# Patient Record
Sex: MALE
[De-identification: names, ages, dates, MRNs, and addresses within clinical notes are randomized per-mention and may not be internally consistent; named-entity substitution may affect disease eponyms.]

---

## 2023-03-28 ENCOUNTER — NURSE TRIAGE (OUTPATIENT)
Dept: OTHER | Facility: CLINIC | Age: 53
End: 2023-03-28

## 2023-03-28 NOTE — TELEPHONE ENCOUNTER
Location of patient: [place]    Subjective: Caller states \"pink eye\"     Current Symptoms: Both eyes are watering, cratchy, and red started last night, woke up this morning with eyelashes glued together. Onset: 1 day ago; gradual    Associated Symptoms: NA    Pain Severity: 1/10; scratchy; intermittent    Temperature: Denies     What has been tried: NA    LMP: NA Pregnant: NA    Recommended disposition: Home Care    Care advice provided, patient verbalizes understanding; denies any other questions or concerns; instructed to call back for any new or worsening symptoms. Patient agrees with home care, will follow up if neccessary    This triage is a result of a call to [address] [place] [address]. Please do not respond to the triage nurse through this encounter. Any subsequent communication should be directly with the patient.     Reason for Disposition   Red eye is part of a cold, and no eye pain or blurred vision    Protocols used: Eye - Red Without Pus-ADULT-[place]

## 2023-04-27 LAB
ALBUMIN (G/DL) IN SER/PLAS: 4.4 G/DL (ref 3.4–5)
ALBUMIN (MG/L) IN URINE: 609.1 MG/L
ALBUMIN/CREATININE (UG/MG) IN URINE: 709.9 UG/MG CRT (ref 0–30)
ANION GAP IN SER/PLAS: 15 MMOL/L (ref 10–20)
CALCIUM (MG/DL) IN SER/PLAS: 10.1 MG/DL (ref 8.6–10.6)
CARBON DIOXIDE, TOTAL (MMOL/L) IN SER/PLAS: 28 MMOL/L (ref 21–32)
CHLORIDE (MMOL/L) IN SER/PLAS: 99 MMOL/L (ref 98–107)
CREATININE (MG/DL) IN SER/PLAS: 0.84 MG/DL (ref 0.5–1.3)
CREATININE (MG/DL) IN URINE: 85.8 MG/DL (ref 20–370)
GFR MALE: >90 ML/MIN/1.73M2
GLUCOSE (MG/DL) IN SER/PLAS: 130 MG/DL (ref 74–99)
PHOSPHATE (MG/DL) IN SER/PLAS: 3.1 MG/DL (ref 2.5–4.9)
POTASSIUM (MMOL/L) IN SER/PLAS: 4.1 MMOL/L (ref 3.5–5.3)
SODIUM (MMOL/L) IN SER/PLAS: 138 MMOL/L (ref 136–145)
THYROTROPIN (MIU/L) IN SER/PLAS BY DETECTION LIMIT <= 0.05 MIU/L: 1.48 MIU/L (ref 0.44–3.98)
URATE (MG/DL) IN SER/PLAS: 4.5 MG/DL (ref 4–7.5)
UREA NITROGEN (MG/DL) IN SER/PLAS: 12 MG/DL (ref 6–23)

## 2023-06-20 ENCOUNTER — OFFICE VISIT (OUTPATIENT)
Dept: PRIMARY CARE | Facility: CLINIC | Age: 53
End: 2023-06-20
Payer: COMMERCIAL

## 2023-06-20 VITALS
HEART RATE: 99 BPM | HEIGHT: 71 IN | OXYGEN SATURATION: 95 % | SYSTOLIC BLOOD PRESSURE: 94 MMHG | DIASTOLIC BLOOD PRESSURE: 60 MMHG | BODY MASS INDEX: 30.46 KG/M2 | WEIGHT: 217.6 LBS

## 2023-06-20 DIAGNOSIS — E78.5 HYPERLIPIDEMIA, UNSPECIFIED HYPERLIPIDEMIA TYPE: ICD-10-CM

## 2023-06-20 DIAGNOSIS — Z00.00 ANNUAL PHYSICAL EXAM: ICD-10-CM

## 2023-06-20 DIAGNOSIS — E66.09 CLASS 1 OBESITY DUE TO EXCESS CALORIES WITH SERIOUS COMORBIDITY AND BODY MASS INDEX (BMI) OF 30.0 TO 30.9 IN ADULT: ICD-10-CM

## 2023-06-20 DIAGNOSIS — I10 PRIMARY HYPERTENSION: ICD-10-CM

## 2023-06-20 DIAGNOSIS — Z13.89 SCREENING FOR OBESITY: ICD-10-CM

## 2023-06-20 DIAGNOSIS — E11.29 DIABETES MELLITUS WITH MICROALBUMINURIA (MULTI): Primary | ICD-10-CM

## 2023-06-20 DIAGNOSIS — R80.9 DIABETES MELLITUS WITH MICROALBUMINURIA (MULTI): Primary | ICD-10-CM

## 2023-06-20 PROBLEM — E66.811 CLASS 1 OBESITY DUE TO EXCESS CALORIES IN ADULT: Status: ACTIVE | Noted: 2023-06-20

## 2023-06-20 PROCEDURE — 3078F DIAST BP <80 MM HG: CPT | Performed by: INTERNAL MEDICINE

## 2023-06-20 PROCEDURE — 3052F HG A1C>EQUAL 8.0%<EQUAL 9.0%: CPT | Performed by: INTERNAL MEDICINE

## 2023-06-20 PROCEDURE — 3074F SYST BP LT 130 MM HG: CPT | Performed by: INTERNAL MEDICINE

## 2023-06-20 PROCEDURE — 3008F BODY MASS INDEX DOCD: CPT | Performed by: INTERNAL MEDICINE

## 2023-06-20 PROCEDURE — 99214 OFFICE O/P EST MOD 30 MIN: CPT | Performed by: INTERNAL MEDICINE

## 2023-06-20 PROCEDURE — 4010F ACE/ARB THERAPY RXD/TAKEN: CPT | Performed by: INTERNAL MEDICINE

## 2023-06-20 PROCEDURE — 1036F TOBACCO NON-USER: CPT | Performed by: INTERNAL MEDICINE

## 2023-06-20 RX ORDER — GLIPIZIDE 5 MG/1
5 TABLET ORAL
COMMUNITY
End: 2023-07-26 | Stop reason: SDUPTHER

## 2023-06-20 RX ORDER — FINERENONE 20 MG/1
1 TABLET, FILM COATED ORAL DAILY
COMMUNITY
Start: 2023-01-30 | End: 2024-06-10 | Stop reason: SDUPTHER

## 2023-06-20 RX ORDER — ALLOPURINOL 300 MG/1
300 TABLET ORAL DAILY
COMMUNITY
End: 2023-07-08

## 2023-06-20 RX ORDER — METFORMIN HYDROCHLORIDE 1000 MG/1
1000 TABLET ORAL 2 TIMES DAILY
COMMUNITY
End: 2023-09-01

## 2023-06-20 RX ORDER — DAPAGLIFLOZIN 10 MG/1
10 TABLET, FILM COATED ORAL
COMMUNITY
End: 2023-12-20 | Stop reason: SDUPTHER

## 2023-06-20 RX ORDER — NAPROXEN SODIUM 220 MG/1
81 TABLET, FILM COATED ORAL
COMMUNITY

## 2023-06-20 RX ORDER — ATORVASTATIN CALCIUM 40 MG/1
40 TABLET, FILM COATED ORAL NIGHTLY
COMMUNITY
End: 2023-12-20 | Stop reason: SDUPTHER

## 2023-06-20 RX ORDER — LISINOPRIL 10 MG/1
10 TABLET ORAL DAILY
COMMUNITY
End: 2023-12-20 | Stop reason: SDUPTHER

## 2023-06-20 ASSESSMENT — PATIENT HEALTH QUESTIONNAIRE - PHQ9
1. LITTLE INTEREST OR PLEASURE IN DOING THINGS: NOT AT ALL
2. FEELING DOWN, DEPRESSED OR HOPELESS: NOT AT ALL
SUM OF ALL RESPONSES TO PHQ9 QUESTIONS 1 AND 2: 0
2. FEELING DOWN, DEPRESSED OR HOPELESS: NOT AT ALL
1. LITTLE INTEREST OR PLEASURE IN DOING THINGS: NOT AT ALL
SUM OF ALL RESPONSES TO PHQ9 QUESTIONS 1 AND 2: 0

## 2023-06-20 ASSESSMENT — ENCOUNTER SYMPTOMS
BLURRED VISION: 0
FATIGUE: 0
VISUAL CHANGE: 0
POLYDIPSIA: 0
POLYPHAGIA: 0
WEAKNESS: 0
CONSTITUTIONAL NEGATIVE: 1
DIABETIC ASSOCIATED SYMPTOMS: 0

## 2023-06-20 NOTE — PROGRESS NOTES
"Patient ID: Luis Manuel Moore is a 53 y.o. male who presents for Annual Exam (Needs boyscout form competed).    BP 94/60   Pulse 99   Ht 1.797 m (5' 10.75\")   Wt 98.7 kg (217 lb 9.6 oz)   SpO2 95%   BMI 30.56 kg/m²     Diabetes  He presents for his follow-up diabetic visit. He has type 2 diabetes mellitus. Onset time: MANY YEARS. There are no hypoglycemic associated symptoms. There are no diabetic associated symptoms. Pertinent negatives for diabetes include no blurred vision, no chest pain, no fatigue, no foot paresthesias, no foot ulcerations, no polydipsia, no polyphagia, no polyuria, no visual change and no weakness. There are no hypoglycemic complications. Symptoms are stable. Diabetic complications include nephropathy. Pertinent negatives for diabetic complications include no autonomic neuropathy, CVA, heart disease, impotence, peripheral neuropathy, PVD or retinopathy. Risk factors for coronary artery disease include male sex, diabetes mellitus and obesity. Current diabetic treatment includes oral agent (triple therapy). He is following a diabetic diet. When asked about meal planning, he reported none. He has not had a previous visit with a dietitian. He participates in exercise every other day. An ACE inhibitor/angiotensin II receptor blocker is being taken. He does not see a podiatrist.Eye exam is current.       Subjective     Review of Systems   Constitutional: Negative.  Negative for fatigue.   Eyes:  Negative for blurred vision.   Cardiovascular:  Negative for chest pain.   Endocrine: Negative for polydipsia, polyphagia and polyuria.   Genitourinary:  Negative for impotence.   Neurological:  Negative for weakness.   All other systems reviewed and are negative.      Objective     Physical Exam  Vitals and nursing note reviewed.   Constitutional:       Appearance: He is obese.   Eyes:      General: Scleral icterus: cmp.   Neck:      Vascular: No carotid bruit.   Cardiovascular:      Rate and Rhythm: Normal " "rate and regular rhythm.      Pulses: Normal pulses.      Heart sounds: Normal heart sounds. No murmur heard.  Pulmonary:      Effort: Pulmonary effort is normal.      Breath sounds: Normal breath sounds.   Musculoskeletal:      Right lower leg: No edema.      Left lower leg: No edema.   Lymphadenopathy:      Cervical: No cervical adenopathy.   Skin:     Capillary Refill: Capillary refill takes more than 3 seconds.   Neurological:      General: No focal deficit present.      Mental Status: He is oriented to person, place, and time. Mental status is at baseline.   Psychiatric:         Mood and Affect: Mood normal.         Behavior: Behavior normal.         Thought Content: Thought content normal.         Judgment: Judgment normal.         No results found for: \"WBC\", \"HGB\", \"HCT\", \"MCV\", \"PLT\"        Problem List Items Addressed This Visit       Annual physical exam    Primary hypertension    Relevant Orders    Comprehensive metabolic panel (Completed)    Diabetes mellitus with microalbuminuria (CMS/[facility]) - Primary    Relevant Orders    Hemoglobin A1c (Completed)    Class 1 obesity due to excess calories in adult    Screening for obesity     Other Visit Diagnoses       Hyperlipidemia, unspecified hyperlipidemia type        Relevant Orders    Lipid panel (Completed)                 A/P       CMP,A1C,LIPID      "

## 2023-06-21 ENCOUNTER — LAB (OUTPATIENT)
Dept: LAB | Facility: LAB | Age: 53
End: 2023-06-21
Payer: COMMERCIAL

## 2023-06-21 DIAGNOSIS — I10 PRIMARY HYPERTENSION: ICD-10-CM

## 2023-06-21 DIAGNOSIS — R80.9 DIABETES MELLITUS WITH MICROALBUMINURIA (MULTI): ICD-10-CM

## 2023-06-21 DIAGNOSIS — E78.5 HYPERLIPIDEMIA, UNSPECIFIED HYPERLIPIDEMIA TYPE: ICD-10-CM

## 2023-06-21 DIAGNOSIS — E11.29 DIABETES MELLITUS WITH MICROALBUMINURIA (MULTI): ICD-10-CM

## 2023-06-21 LAB
ALANINE AMINOTRANSFERASE (SGPT) (U/L) IN SER/PLAS: 48 U/L (ref 10–52)
ALBUMIN (G/DL) IN SER/PLAS: 4.5 G/DL (ref 3.4–5)
ALKALINE PHOSPHATASE (U/L) IN SER/PLAS: 81 U/L (ref 33–120)
ANION GAP IN SER/PLAS: 14 MMOL/L (ref 10–20)
ASPARTATE AMINOTRANSFERASE (SGOT) (U/L) IN SER/PLAS: 28 U/L (ref 9–39)
BILIRUBIN TOTAL (MG/DL) IN SER/PLAS: 1.1 MG/DL (ref 0–1.2)
CALCIUM (MG/DL) IN SER/PLAS: 9.7 MG/DL (ref 8.6–10.6)
CARBON DIOXIDE, TOTAL (MMOL/L) IN SER/PLAS: 25 MMOL/L (ref 21–32)
CHLORIDE (MMOL/L) IN SER/PLAS: 101 MMOL/L (ref 98–107)
CHOLESTEROL (MG/DL) IN SER/PLAS: 92 MG/DL (ref 0–199)
CHOLESTEROL IN HDL (MG/DL) IN SER/PLAS: 26.4 MG/DL
CHOLESTEROL/HDL RATIO: 3.5
CREATININE (MG/DL) IN SER/PLAS: 0.96 MG/DL (ref 0.5–1.3)
GFR MALE: >90 ML/MIN/1.73M2
GLUCOSE (MG/DL) IN SER/PLAS: 180 MG/DL (ref 74–99)
LDL: ABNORMAL MG/DL (ref 0–99)
NON HDL CHOLESTEROL: 66 MG/DL
POTASSIUM (MMOL/L) IN SER/PLAS: 4.4 MMOL/L (ref 3.5–5.3)
PROTEIN TOTAL: 7.4 G/DL (ref 6.4–8.2)
SODIUM (MMOL/L) IN SER/PLAS: 136 MMOL/L (ref 136–145)
TRIGLYCERIDE (MG/DL) IN SER/PLAS: 440 MG/DL (ref 0–149)
UREA NITROGEN (MG/DL) IN SER/PLAS: 18 MG/DL (ref 6–23)
VLDL: ABNORMAL MG/DL (ref 0–40)

## 2023-06-21 PROCEDURE — 80061 LIPID PANEL: CPT

## 2023-06-21 PROCEDURE — 83036 HEMOGLOBIN GLYCOSYLATED A1C: CPT

## 2023-06-21 PROCEDURE — 80053 COMPREHEN METABOLIC PANEL: CPT

## 2023-06-21 PROCEDURE — 36415 COLL VENOUS BLD VENIPUNCTURE: CPT

## 2023-06-22 LAB
ESTIMATED AVERAGE GLUCOSE FOR HBA1C: 186 MG/DL
HEMOGLOBIN A1C/HEMOGLOBIN TOTAL IN BLOOD: 8.1 %

## 2023-07-07 DIAGNOSIS — M1A.9XX0 CHRONIC GOUT WITHOUT TOPHUS, UNSPECIFIED CAUSE, UNSPECIFIED SITE: ICD-10-CM

## 2023-07-08 RX ORDER — ALLOPURINOL 300 MG/1
300 TABLET ORAL DAILY
Qty: 90 TABLET | Refills: 1 | Status: SHIPPED | OUTPATIENT
Start: 2023-07-08 | End: 2023-12-20 | Stop reason: SDUPTHER

## 2023-07-26 DIAGNOSIS — E11.69 TYPE 2 DIABETES MELLITUS WITH OTHER SPECIFIED COMPLICATION, WITHOUT LONG-TERM CURRENT USE OF INSULIN (MULTI): Primary | ICD-10-CM

## 2023-07-26 RX ORDER — GLIPIZIDE 5 MG/1
5 TABLET ORAL
Qty: 180 TABLET | Refills: 2 | Status: SHIPPED | OUTPATIENT
Start: 2023-07-26 | End: 2023-10-30 | Stop reason: SDUPTHER

## 2023-07-27 ENCOUNTER — TELEPHONE (OUTPATIENT)
Dept: PRIMARY CARE | Facility: CLINIC | Age: 53
End: 2023-07-27
Payer: COMMERCIAL

## 2023-08-09 LAB
ALBUMIN (G/DL) IN SER/PLAS: 4.5 G/DL (ref 3.4–5)
ANION GAP IN SER/PLAS: 14 MMOL/L (ref 10–20)
CALCIUM (MG/DL) IN SER/PLAS: 9.7 MG/DL (ref 8.6–10.3)
CARBON DIOXIDE, TOTAL (MMOL/L) IN SER/PLAS: 27 MMOL/L (ref 21–32)
CHLORIDE (MMOL/L) IN SER/PLAS: 98 MMOL/L (ref 98–107)
CREATININE (MG/DL) IN SER/PLAS: 0.83 MG/DL (ref 0.5–1.3)
GFR MALE: >90 ML/MIN/1.73M2
GLUCOSE (MG/DL) IN SER/PLAS: 141 MG/DL (ref 74–99)
PHOSPHATE (MG/DL) IN SER/PLAS: 3.1 MG/DL (ref 2.5–4.9)
POTASSIUM (MMOL/L) IN SER/PLAS: 4.5 MMOL/L (ref 3.5–5.3)
SODIUM (MMOL/L) IN SER/PLAS: 134 MMOL/L (ref 136–145)
URATE (MG/DL) IN SER/PLAS: 5.4 MG/DL (ref 4–7.5)
UREA NITROGEN (MG/DL) IN SER/PLAS: 16 MG/DL (ref 6–23)

## 2023-08-10 LAB
ALBUMIN (MG/L) IN URINE: 355.1 MG/L
ALBUMIN/CREATININE (UG/MG) IN URINE: 448.9 UG/MG CRT (ref 0–30)
CREATININE (MG/DL) IN URINE: 79.1 MG/DL (ref 20–370)

## 2023-08-31 DIAGNOSIS — E11.29 DIABETES MELLITUS WITH MICROALBUMINURIA (MULTI): ICD-10-CM

## 2023-08-31 DIAGNOSIS — R80.9 DIABETES MELLITUS WITH MICROALBUMINURIA (MULTI): ICD-10-CM

## 2023-09-01 RX ORDER — METFORMIN HYDROCHLORIDE 1000 MG/1
1000 TABLET ORAL 2 TIMES DAILY
Qty: 180 TABLET | Refills: 1 | Status: SHIPPED | OUTPATIENT
Start: 2023-09-01 | End: 2023-12-20 | Stop reason: SDUPTHER

## 2023-10-29 PROBLEM — M75.100 ROTATOR CUFF TEAR: Status: ACTIVE | Noted: 2023-10-29

## 2023-10-29 PROBLEM — M75.50 SUBACROMIAL BURSITIS: Status: ACTIVE | Noted: 2023-10-29

## 2023-10-29 PROBLEM — E78.5 DYSLIPIDEMIA: Status: ACTIVE | Noted: 2023-10-29

## 2023-10-29 PROBLEM — M20.41 HAMMER TOES, BILATERAL: Status: ACTIVE | Noted: 2023-10-29

## 2023-10-29 PROBLEM — M1A.9XX0 CHRONIC GOUT: Status: ACTIVE | Noted: 2023-10-29

## 2023-10-29 PROBLEM — E66.9 OBESITY WITH BODY MASS INDEX 30 OR GREATER: Status: ACTIVE | Noted: 2023-10-29

## 2023-10-29 PROBLEM — M25.611 STIFFNESS OF RIGHT SHOULDER, NOT ELSEWHERE CLASSIFIED: Status: ACTIVE | Noted: 2023-10-29

## 2023-10-29 PROBLEM — R00.0 TACHYCARDIA: Status: ACTIVE | Noted: 2023-10-29

## 2023-10-29 PROBLEM — E11.21 MACROALBUMINURIC DIABETIC NEPHROPATHY (MULTI): Status: ACTIVE | Noted: 2023-10-29

## 2023-10-29 PROBLEM — R74.01 ELEVATED TRANSAMINASE LEVEL: Status: ACTIVE | Noted: 2023-10-29

## 2023-10-29 PROBLEM — B35.1 ONYCHOMYCOSIS OF TOENAIL: Status: ACTIVE | Noted: 2023-10-29

## 2023-10-29 PROBLEM — L60.3 ONYCHODYSTROPHY: Status: ACTIVE | Noted: 2023-10-29

## 2023-10-29 PROBLEM — R80.9 MICROALBUMINURIA: Status: ACTIVE | Noted: 2023-10-29

## 2023-10-29 PROBLEM — M20.42 HAMMER TOES, BILATERAL: Status: ACTIVE | Noted: 2023-10-29

## 2023-10-29 PROBLEM — E78.1 HYPERTRIGLYCERIDEMIA, ESSENTIAL: Status: ACTIVE | Noted: 2023-10-29

## 2023-10-29 PROBLEM — E11.9 TYPE 2 DIABETES MELLITUS (MULTI): Status: ACTIVE | Noted: 2023-10-29

## 2023-10-29 PROBLEM — M75.01 ADHESIVE CAPSULITIS OF RIGHT SHOULDER: Status: ACTIVE | Noted: 2023-10-29

## 2023-10-29 RX ORDER — ALLOPURINOL 100 MG/1
100 TABLET ORAL
COMMUNITY
Start: 2016-08-03 | End: 2023-10-30 | Stop reason: WASHOUT

## 2023-10-29 RX ORDER — MONTELUKAST SODIUM 10 MG/1
1 TABLET ORAL NIGHTLY
COMMUNITY
Start: 2014-09-03

## 2023-10-29 RX ORDER — SEMAGLUTIDE 0.68 MG/ML
INJECTION, SOLUTION SUBCUTANEOUS
COMMUNITY
End: 2023-10-30 | Stop reason: DRUGHIGH

## 2023-10-29 RX ORDER — METFORMIN HYDROCHLORIDE 500 MG/1
500 TABLET, EXTENDED RELEASE ORAL 2 TIMES DAILY
COMMUNITY
Start: 2016-08-11 | End: 2023-10-30 | Stop reason: WASHOUT

## 2023-10-29 RX ORDER — LANCETS 28 GAUGE
EACH MISCELLANEOUS
COMMUNITY
Start: 2023-07-27 | End: 2023-10-30 | Stop reason: CLARIF

## 2023-10-29 RX ORDER — FENOFIBRATE 160 MG/1
160 TABLET ORAL
COMMUNITY
Start: 2016-08-03 | End: 2023-10-30 | Stop reason: WASHOUT

## 2023-10-29 RX ORDER — CEFUROXIME AXETIL 250 MG/1
250 TABLET ORAL 2 TIMES DAILY
COMMUNITY
Start: 2016-08-03 | End: 2023-10-30 | Stop reason: WASHOUT

## 2023-10-29 RX ORDER — LISINOPRIL 2.5 MG/1
2.5 TABLET ORAL
COMMUNITY
Start: 2016-08-03 | End: 2023-10-30 | Stop reason: WASHOUT

## 2023-10-30 ENCOUNTER — OFFICE VISIT (OUTPATIENT)
Dept: ENDOCRINOLOGY | Facility: CLINIC | Age: 53
End: 2023-10-30
Payer: COMMERCIAL

## 2023-10-30 VITALS
DIASTOLIC BLOOD PRESSURE: 71 MMHG | HEART RATE: 89 BPM | WEIGHT: 213 LBS | BODY MASS INDEX: 29.29 KG/M2 | SYSTOLIC BLOOD PRESSURE: 102 MMHG

## 2023-10-30 DIAGNOSIS — E11.69 TYPE 2 DIABETES MELLITUS WITH OTHER SPECIFIED COMPLICATION, WITHOUT LONG-TERM CURRENT USE OF INSULIN (MULTI): ICD-10-CM

## 2023-10-30 DIAGNOSIS — R80.9 DIABETES MELLITUS WITH MICROALBUMINURIA (MULTI): ICD-10-CM

## 2023-10-30 DIAGNOSIS — E11.29 DIABETES MELLITUS WITH MICROALBUMINURIA (MULTI): ICD-10-CM

## 2023-10-30 LAB — POC HEMOGLOBIN A1C: 6.9 % (ref 4.2–6.5)

## 2023-10-30 PROCEDURE — 3074F SYST BP LT 130 MM HG: CPT | Performed by: INTERNAL MEDICINE

## 2023-10-30 PROCEDURE — 3052F HG A1C>EQUAL 8.0%<EQUAL 9.0%: CPT | Performed by: INTERNAL MEDICINE

## 2023-10-30 PROCEDURE — 1036F TOBACCO NON-USER: CPT | Performed by: INTERNAL MEDICINE

## 2023-10-30 PROCEDURE — 3078F DIAST BP <80 MM HG: CPT | Performed by: INTERNAL MEDICINE

## 2023-10-30 PROCEDURE — 4010F ACE/ARB THERAPY RXD/TAKEN: CPT | Performed by: INTERNAL MEDICINE

## 2023-10-30 PROCEDURE — 3008F BODY MASS INDEX DOCD: CPT | Performed by: INTERNAL MEDICINE

## 2023-10-30 PROCEDURE — 99214 OFFICE O/P EST MOD 30 MIN: CPT | Performed by: INTERNAL MEDICINE

## 2023-10-30 PROCEDURE — 83036 HEMOGLOBIN GLYCOSYLATED A1C: CPT | Performed by: INTERNAL MEDICINE

## 2023-10-30 RX ORDER — GLIPIZIDE 5 MG/1
5 TABLET ORAL
Qty: 180 TABLET | Refills: 2
Start: 2023-10-30 | End: 2023-12-20 | Stop reason: SDUPTHER

## 2023-10-30 RX ORDER — LANCETS 33 GAUGE
EACH MISCELLANEOUS
Qty: 100 EACH | Refills: 3 | Status: SHIPPED | OUTPATIENT
Start: 2023-10-30

## 2023-10-30 RX ORDER — DEXTROSE 4 G
TABLET,CHEWABLE ORAL
Qty: 1 EACH | Refills: 0 | Status: SHIPPED | OUTPATIENT
Start: 2023-10-30

## 2023-10-30 RX ORDER — SEMAGLUTIDE 1.34 MG/ML
1 INJECTION, SOLUTION SUBCUTANEOUS
Qty: 9 ML | Refills: 3 | Status: SHIPPED | OUTPATIENT
Start: 2023-10-30 | End: 2024-10-29

## 2023-10-30 RX ORDER — BLOOD-GLUCOSE METER
EACH MISCELLANEOUS
Qty: 100 STRIP | Refills: 3 | Status: SHIPPED | OUTPATIENT
Start: 2023-10-30

## 2023-10-30 ASSESSMENT — ENCOUNTER SYMPTOMS
ABDOMINAL PAIN: 0
VOMITING: 0
UNEXPECTED WEIGHT CHANGE: 0
COUGH: 0
NAUSEA: 0
ENDOCRINE COMMENTS: AS ABOVE
CONSTIPATION: 0
DIARRHEA: 0

## 2023-10-30 NOTE — PROGRESS NOTES
History Of Present Illness  Luis Manuel Moore is a 53 y.o. male     Duration of type 2 diabetes mellitus:  8 years  Complications:  albuminuria    Decreased appetite on Ozempic    Ozempic 0.5 mg/week  Metformin 1000 mg BID  Farxiga 5 mg/day  Glipizide 5 mg BID  Patient is testing glucose 1 time daily  Records reviewed, on file    Last eye exam:  April 2023    Past Medical History  He has a past medical history of Achilles tendinitis, unspecified leg (05/26/2021), Elevation of levels of liver transaminase levels (02/27/2019), Elevation of levels of liver transaminase levels (06/06/2018), Personal history of other diseases of the circulatory system, Personal history of other diseases of the respiratory system (05/26/2021), Personal history of other endocrine, nutritional and metabolic disease, and Strain of unspecified achilles tendon, initial encounter (05/26/2021).    Surgical History  He has a past surgical history that includes Carpal tunnel release (03/16/2015) and US guided needle liver biopsy (12/7/2020).     Social History  He reports that he has never smoked. He has never used smokeless tobacco. No history on file for alcohol use and drug use.    Family History  Family History   Problem Relation Name Age of Onset    Diabetes Father      Other (cardiac disorder) Other      Hypertension Other         Allergies  Doxycycline    Review of Systems   Constitutional:  Negative for unexpected weight change.   Eyes:  Negative for visual disturbance.   Respiratory:  Negative for cough.    Gastrointestinal:  Negative for abdominal pain, constipation, diarrhea, nausea and vomiting.   Endocrine:        As above         Last Recorded Vitals  Blood pressure 102/71, pulse 89, weight 96.6 kg (213 lb).    Physical exam  CONSTITUTIONAL: Well appearing middle aged male  HEAD AND FACE: Normocephalic, symmetric  EYES: Extraocular motions intact  NECK: no goiter  CARDIOVASCULAR: no edema, 2+ radial arteries bilaterally  NEUROLOGIC: No  "hand tremor  PSYCHIATRIC: Oriented, normal affect    Relevant Results  Glucose (mg/dL)   Date Value   08/09/2023 141 (H)   06/21/2023 180 (H)   04/27/2023 130 (H)     TR HGBA1C (Data Conversion) (%)   Date Value   07/08/2020 8.6 (H)   03/10/2020 9.4 (H)   12/10/2019 7.6 (H)     POC HEMOGLOBIN A1c (%)   Date Value   10/30/2023 6.9 (A)     Hemoglobin A1C (%)   Date Value   06/21/2023 8.1 (A)   11/10/2021 7.0 (A)   05/13/2021 7.2     Bicarbonate (mmol/L)   Date Value   08/09/2023 27   06/21/2023 25   04/27/2023 28     Urea Nitrogen (mg/dL)   Date Value   08/09/2023 16   06/21/2023 18   04/27/2023 12     Creatinine (mg/dL)   Date Value   08/09/2023 0.83   06/21/2023 0.96   04/27/2023 0.84     No components found for: \"VIPOBHGXMTWSSZ2U\"  Lab Results   Component Value Date    CHOL 92 06/21/2023    CHOL 96 11/15/2022    CHOL 109 05/16/2022     Lab Results   Component Value Date    HDL 26.4 (A) 06/21/2023    HDL 33.0 (A) 11/15/2022    HDL 38.8 (A) 05/16/2022     No results found for: \"LDLCALC\"  Lab Results   Component Value Date    TRIG 440 (H) 06/21/2023    TRIG 202 (H) 11/15/2022    TRIG 195 (H) 05/16/2022     No components found for: \"CHOLHDL\"   Lab Results   Component Value Date    TSH 1.48 04/27/2023     No results found for: \"ALBUR\", \"[id number]\"       IMPRESSION  TYPE 2 DIABETES MELLITUS  Rapid A1c 6.9%  He was not able to stop glipizide with introduction of Ozempic  Tolerating Ozempic with good effect      RECOMMENDATIONS  Increase Ozempic to 1 mg every 7 days  Try to decrease or discontinue glipizide    Follow up 3 months    "

## 2023-10-30 NOTE — PATIENT INSTRUCTIONS
A1c 6.9%    RECOMMENDATIONS  Increase Ozempic to 1 mg every 7 days  Try to decrease or discontinue glipizide    Follow up 3 months

## 2023-12-15 ENCOUNTER — TELEPHONE (OUTPATIENT)
Dept: PRIMARY CARE | Facility: CLINIC | Age: 53
End: 2023-12-15
Payer: COMMERCIAL

## 2023-12-15 DIAGNOSIS — E78.1 HYPERTRIGLYCERIDEMIA: ICD-10-CM

## 2023-12-15 DIAGNOSIS — E11.69 TYPE 2 DIABETES MELLITUS WITH OTHER SPECIFIED COMPLICATION, WITHOUT LONG-TERM CURRENT USE OF INSULIN (MULTI): Primary | ICD-10-CM

## 2023-12-19 ENCOUNTER — LAB (OUTPATIENT)
Dept: LAB | Facility: LAB | Age: 53
End: 2023-12-19
Payer: COMMERCIAL

## 2023-12-19 DIAGNOSIS — E11.69 TYPE 2 DIABETES MELLITUS WITH OTHER SPECIFIED COMPLICATION, WITHOUT LONG-TERM CURRENT USE OF INSULIN (MULTI): ICD-10-CM

## 2023-12-19 DIAGNOSIS — E78.1 HYPERTRIGLYCERIDEMIA: ICD-10-CM

## 2023-12-19 LAB
ALBUMIN SERPL BCP-MCNC: 4.4 G/DL (ref 3.4–5)
ALP SERPL-CCNC: 78 U/L (ref 33–120)
ALT SERPL W P-5'-P-CCNC: 45 U/L (ref 10–52)
ANION GAP SERPL CALC-SCNC: 16 MMOL/L (ref 10–20)
AST SERPL W P-5'-P-CCNC: 30 U/L (ref 9–39)
BILIRUB SERPL-MCNC: 1.1 MG/DL (ref 0–1.2)
BUN SERPL-MCNC: 16 MG/DL (ref 6–23)
CALCIUM SERPL-MCNC: 11 MG/DL (ref 8.6–10.6)
CHLORIDE SERPL-SCNC: 95 MMOL/L (ref 98–107)
CHOLEST SERPL-MCNC: 90 MG/DL (ref 0–199)
CHOLESTEROL/HDL RATIO: 2.8
CO2 SERPL-SCNC: 31 MMOL/L (ref 21–32)
CREAT SERPL-MCNC: 1.02 MG/DL (ref 0.5–1.3)
EST. AVERAGE GLUCOSE BLD GHB EST-MCNC: 146 MG/DL
GFR SERPL CREATININE-BSD FRML MDRD: 87 ML/MIN/1.73M*2
GLUCOSE SERPL-MCNC: 104 MG/DL (ref 74–99)
HBA1C MFR BLD: 6.7 %
HDLC SERPL-MCNC: 32.6 MG/DL
LDLC SERPL CALC-MCNC: 7 MG/DL
NON HDL CHOLESTEROL: 57 MG/DL (ref 0–149)
POTASSIUM SERPL-SCNC: 4.6 MMOL/L (ref 3.5–5.3)
PROT SERPL-MCNC: 7.9 G/DL (ref 6.4–8.2)
SODIUM SERPL-SCNC: 137 MMOL/L (ref 136–145)
TRIGL SERPL-MCNC: 253 MG/DL (ref 0–149)
VLDL: 51 MG/DL (ref 0–40)

## 2023-12-19 PROCEDURE — 36415 COLL VENOUS BLD VENIPUNCTURE: CPT

## 2023-12-19 PROCEDURE — 82043 UR ALBUMIN QUANTITATIVE: CPT

## 2023-12-19 PROCEDURE — 82570 ASSAY OF URINE CREATININE: CPT

## 2023-12-19 PROCEDURE — 83036 HEMOGLOBIN GLYCOSYLATED A1C: CPT

## 2023-12-19 PROCEDURE — 80053 COMPREHEN METABOLIC PANEL: CPT

## 2023-12-19 PROCEDURE — 80061 LIPID PANEL: CPT

## 2023-12-20 ENCOUNTER — OFFICE VISIT (OUTPATIENT)
Dept: PRIMARY CARE | Facility: CLINIC | Age: 53
End: 2023-12-20
Payer: COMMERCIAL

## 2023-12-20 VITALS
WEIGHT: 210.2 LBS | OXYGEN SATURATION: 97 % | BODY MASS INDEX: 28.91 KG/M2 | SYSTOLIC BLOOD PRESSURE: 102 MMHG | HEART RATE: 101 BPM | DIASTOLIC BLOOD PRESSURE: 70 MMHG

## 2023-12-20 DIAGNOSIS — M1A.9XX0 CHRONIC GOUT WITHOUT TOPHUS, UNSPECIFIED CAUSE, UNSPECIFIED SITE: ICD-10-CM

## 2023-12-20 DIAGNOSIS — R80.9 DIABETES MELLITUS WITH MICROALBUMINURIA (MULTI): ICD-10-CM

## 2023-12-20 DIAGNOSIS — E11.29 DIABETES MELLITUS WITH MICROALBUMINURIA (MULTI): ICD-10-CM

## 2023-12-20 DIAGNOSIS — E78.49 OTHER HYPERLIPIDEMIA: Primary | ICD-10-CM

## 2023-12-20 DIAGNOSIS — R80.9 MICROALBUMINURIA: ICD-10-CM

## 2023-12-20 DIAGNOSIS — E11.69 TYPE 2 DIABETES MELLITUS WITH OTHER SPECIFIED COMPLICATION, WITHOUT LONG-TERM CURRENT USE OF INSULIN (MULTI): ICD-10-CM

## 2023-12-20 DIAGNOSIS — I10 PRIMARY HYPERTENSION: ICD-10-CM

## 2023-12-20 DIAGNOSIS — E11.21 MACROALBUMINURIC DIABETIC NEPHROPATHY (MULTI): ICD-10-CM

## 2023-12-20 DIAGNOSIS — E78.1 HYPERTRIGLYCERIDEMIA, ESSENTIAL: ICD-10-CM

## 2023-12-20 PROBLEM — E83.52 HYPERCALCEMIA: Status: ACTIVE | Noted: 2023-12-20

## 2023-12-20 LAB
CREAT UR-MCNC: 165 MG/DL (ref 20–370)
MICROALBUMIN UR-MCNC: 278.4 MG/L
MICROALBUMIN/CREAT UR: 168.7 UG/MG CREAT

## 2023-12-20 PROCEDURE — 99213 OFFICE O/P EST LOW 20 MIN: CPT | Performed by: INTERNAL MEDICINE

## 2023-12-20 PROCEDURE — 3074F SYST BP LT 130 MM HG: CPT | Performed by: INTERNAL MEDICINE

## 2023-12-20 PROCEDURE — 4010F ACE/ARB THERAPY RXD/TAKEN: CPT | Performed by: INTERNAL MEDICINE

## 2023-12-20 PROCEDURE — 1036F TOBACCO NON-USER: CPT | Performed by: INTERNAL MEDICINE

## 2023-12-20 PROCEDURE — 3044F HG A1C LEVEL LT 7.0%: CPT | Performed by: INTERNAL MEDICINE

## 2023-12-20 PROCEDURE — 3078F DIAST BP <80 MM HG: CPT | Performed by: INTERNAL MEDICINE

## 2023-12-20 PROCEDURE — 3060F POS MICROALBUMINURIA REV: CPT | Performed by: INTERNAL MEDICINE

## 2023-12-20 PROCEDURE — 3048F LDL-C <100 MG/DL: CPT | Performed by: INTERNAL MEDICINE

## 2023-12-20 PROCEDURE — 3008F BODY MASS INDEX DOCD: CPT | Performed by: INTERNAL MEDICINE

## 2023-12-20 PROCEDURE — 3066F NEPHROPATHY DOC TX: CPT | Performed by: INTERNAL MEDICINE

## 2023-12-20 RX ORDER — LISINOPRIL 10 MG/1
10 TABLET ORAL DAILY
Qty: 90 TABLET | Refills: 2 | Status: SHIPPED | OUTPATIENT
Start: 2023-12-20

## 2023-12-20 RX ORDER — ALLOPURINOL 300 MG/1
300 TABLET ORAL DAILY
Qty: 90 TABLET | Refills: 1 | Status: SHIPPED | OUTPATIENT
Start: 2023-12-20

## 2023-12-20 RX ORDER — GLIPIZIDE 5 MG/1
5 TABLET ORAL
Qty: 180 TABLET | Refills: 2 | Status: SHIPPED | OUTPATIENT
Start: 2023-12-20 | End: 2024-02-14 | Stop reason: SDUPTHER

## 2023-12-20 RX ORDER — METFORMIN HYDROCHLORIDE 1000 MG/1
1000 TABLET ORAL 2 TIMES DAILY
Qty: 180 TABLET | Refills: 2 | Status: SHIPPED | OUTPATIENT
Start: 2023-12-20

## 2023-12-20 RX ORDER — ATORVASTATIN CALCIUM 40 MG/1
40 TABLET, FILM COATED ORAL NIGHTLY
Qty: 90 TABLET | Refills: 2 | Status: SHIPPED | OUTPATIENT
Start: 2023-12-20

## 2023-12-20 RX ORDER — DAPAGLIFLOZIN 10 MG/1
10 TABLET, FILM COATED ORAL
Qty: 90 TABLET | Refills: 1 | Status: SHIPPED | OUTPATIENT
Start: 2023-12-20

## 2023-12-20 ASSESSMENT — PATIENT HEALTH QUESTIONNAIRE - PHQ9
1. LITTLE INTEREST OR PLEASURE IN DOING THINGS: NOT AT ALL
1. LITTLE INTEREST OR PLEASURE IN DOING THINGS: NOT AT ALL
SUM OF ALL RESPONSES TO PHQ9 QUESTIONS 1 AND 2: 0
SUM OF ALL RESPONSES TO PHQ9 QUESTIONS 1 AND 2: 0
2. FEELING DOWN, DEPRESSED OR HOPELESS: NOT AT ALL
2. FEELING DOWN, DEPRESSED OR HOPELESS: NOT AT ALL

## 2023-12-20 ASSESSMENT — ENCOUNTER SYMPTOMS: CONSTITUTIONAL NEGATIVE: 1

## 2023-12-20 NOTE — PROGRESS NOTES
"Patient ID: Luis Manuel Moore is a 53 y.o. male who presents for Follow-up (6 month follow up).    /70   Pulse 101   Wt 95.3 kg (210 lb 3.2 oz)   SpO2 97%   BMI 28.91 kg/m²     HPI      Hypertension on medication controlled  No chest pain, no shortness of breath, no PND, no orthopnea,  No palpitations, no leg swelling, no headache,      Diabetes with microalbuminuria  On Kerendia, lisinopril      Diabetes with hyperlipidemia  On atorvastatin, LDL at the target      Subjective     Review of Systems   Constitutional: Negative.    All other systems reviewed and are negative.      Objective     Physical Exam  Vitals and nursing note reviewed.   Neck:      Vascular: No carotid bruit.   Cardiovascular:      Rate and Rhythm: Normal rate and regular rhythm.      Pulses: Normal pulses.      Heart sounds: Normal heart sounds.   Pulmonary:      Effort: Pulmonary effort is normal.      Breath sounds: Normal breath sounds.   Musculoskeletal:      Right lower leg: No edema.      Left lower leg: No edema.   Skin:     Capillary Refill: Capillary refill takes more than 3 seconds.   Neurological:      General: No focal deficit present.      Mental Status: He is oriented to person, place, and time. Mental status is at baseline.   Psychiatric:         Mood and Affect: Mood normal.         Behavior: Behavior normal.         Thought Content: Thought content normal.         Judgment: Judgment normal.         No results found for: \"WBC\", \"HGB\", \"HCT\", \"MCV\", \"PLT\"        Problem List Items Addressed This Visit       Primary hypertension    Diabetes mellitus with microalbuminuria (CMS/HCC)    Relevant Medications    metFORMIN (Glucophage) 1,000 mg tablet    Hyperlipidemia - Primary    Relevant Medications    atorvastatin (Lipitor) 40 mg tablet    Hypertriglyceridemia, essential    Chronic gout    Relevant Medications    allopurinol (Zyloprim) 300 mg tablet    Macroalbuminuric diabetic nephropathy (CMS/HCC)    Microalbuminuria    Type 2 " diabetes mellitus (CMS/HCC)    Relevant Medications    glipiZIDE (Glucotrol) 5 mg tablet    Farxiga 10 mg    lisinopril 10 mg tablet            A/P         I told him he needs to drink more fluid  He needs to get his calcium recheck  Since he is going to see Dr. Evans endocrinology  He will bring that to him  I refilled his atorvastatin 40 mg a day  Glipizide 5 mg 1 pill 2 times daily  Allopurinol 300 mg 1 pill a day  Farxiga 10 mg 1 pill a day  Lisinopril 10 mg 1 pill a day  Metformin 1000 mg 1 pill twice a day  Follow-up in 6 months time

## 2023-12-22 ENCOUNTER — APPOINTMENT (OUTPATIENT)
Dept: ORTHOPEDIC SURGERY | Facility: CLINIC | Age: 53
End: 2023-12-22
Payer: COMMERCIAL

## 2024-01-09 ENCOUNTER — OFFICE VISIT (OUTPATIENT)
Dept: ORTHOPEDIC SURGERY | Facility: CLINIC | Age: 54
End: 2024-01-09
Payer: COMMERCIAL

## 2024-01-09 DIAGNOSIS — Z98.890 S/P ROTATOR CUFF REPAIR: Primary | ICD-10-CM

## 2024-01-09 PROCEDURE — 99213 OFFICE O/P EST LOW 20 MIN: CPT | Performed by: STUDENT IN AN ORGANIZED HEALTH CARE EDUCATION/TRAINING PROGRAM

## 2024-01-09 PROCEDURE — 4010F ACE/ARB THERAPY RXD/TAKEN: CPT | Performed by: STUDENT IN AN ORGANIZED HEALTH CARE EDUCATION/TRAINING PROGRAM

## 2024-01-09 PROCEDURE — 3066F NEPHROPATHY DOC TX: CPT | Performed by: STUDENT IN AN ORGANIZED HEALTH CARE EDUCATION/TRAINING PROGRAM

## 2024-01-09 PROCEDURE — 3008F BODY MASS INDEX DOCD: CPT | Performed by: STUDENT IN AN ORGANIZED HEALTH CARE EDUCATION/TRAINING PROGRAM

## 2024-01-09 PROCEDURE — 1036F TOBACCO NON-USER: CPT | Performed by: STUDENT IN AN ORGANIZED HEALTH CARE EDUCATION/TRAINING PROGRAM

## 2024-01-09 NOTE — PROGRESS NOTES
Luis Manuel returns.  He is now about 10 months out from his rotator cuff repair and lysis of adhesions and manipulations of the shoulder.  He reports that he is doing very well he does not report any limitations of the shoulder he is very happy with his range of motion he is out of physical therapy just working on home stretches daily.    Review of Systems  A complete review of systems was conducted, pertinent only to the HPI noted above.  Constitutional: None  Eyes: No additions to above history  Ears, Nose, Throat: No additions to above history  Cardiovascular: No additions to above history  Respiratory: No additions to above history  GI: No additions to above history  : No additions to above history  Skin/Neuro: No additions to above history  Endocrine/Heme/Lymph: No additions to above history  Immunologic: No additions to above history  Psychiatric: No additions to above history  Musculoskeletal: see above    Physical Exam  GEN: Alert and Oriented x 3  Constitutional: Well appearing, in no apparent distress.        Focused Musculoskeletal Exam:     Right  shoulder:  Incision exam deferred.  His active forward elevation is roughly 170 degrees external rotation at the side to 60 abduction external rotation is 90 abduction internal rotation is about 80-85 internal rotation to T10 5 out of 5 cuff strength    Sensation intact Ax/median/ulnar/radial distributions  Motor intact Ax/median/radial/ulnar/AIN/PIN    Luis Manuel is done very well encouraged continued home stretching program for maintenance.  He may follow-up as needed.  All questions answered, patient in agreement with the plan.    Edgar Alvarez MD  Orthopaedic Sports Medicine

## 2024-02-10 ENCOUNTER — LAB (OUTPATIENT)
Dept: LAB | Facility: LAB | Age: 54
End: 2024-02-10
Payer: COMMERCIAL

## 2024-02-10 DIAGNOSIS — E11.21 TYPE 2 DIABETES MELLITUS WITH DIABETIC NEPHROPATHY (MULTI): Primary | ICD-10-CM

## 2024-02-10 LAB
ALBUMIN SERPL BCP-MCNC: 4.4 G/DL (ref 3.4–5)
ANION GAP SERPL CALC-SCNC: 15 MMOL/L (ref 10–20)
BUN SERPL-MCNC: 14 MG/DL (ref 6–23)
CALCIUM SERPL-MCNC: 9.9 MG/DL (ref 8.6–10.6)
CHLORIDE SERPL-SCNC: 98 MMOL/L (ref 98–107)
CO2 SERPL-SCNC: 27 MMOL/L (ref 21–32)
CREAT SERPL-MCNC: 0.85 MG/DL (ref 0.5–1.3)
CREAT UR-MCNC: 70.5 MG/DL (ref 20–370)
EGFRCR SERPLBLD CKD-EPI 2021: >90 ML/MIN/1.73M*2
GLUCOSE SERPL-MCNC: 98 MG/DL (ref 74–99)
MICROALBUMIN UR-MCNC: 59.6 MG/L
MICROALBUMIN/CREAT UR: 84.5 UG/MG CREAT
PHOSPHATE SERPL-MCNC: 3.6 MG/DL (ref 2.5–4.9)
POTASSIUM SERPL-SCNC: 4.6 MMOL/L (ref 3.5–5.3)
SODIUM SERPL-SCNC: 135 MMOL/L (ref 136–145)
URATE SERPL-MCNC: 4.8 MG/DL (ref 4–7.5)

## 2024-02-10 PROCEDURE — 36415 COLL VENOUS BLD VENIPUNCTURE: CPT

## 2024-02-10 PROCEDURE — 82570 ASSAY OF URINE CREATININE: CPT

## 2024-02-10 PROCEDURE — 84550 ASSAY OF BLOOD/URIC ACID: CPT

## 2024-02-10 PROCEDURE — 80069 RENAL FUNCTION PANEL: CPT

## 2024-02-10 PROCEDURE — 82043 UR ALBUMIN QUANTITATIVE: CPT

## 2024-02-14 ENCOUNTER — OFFICE VISIT (OUTPATIENT)
Dept: NEPHROLOGY | Facility: CLINIC | Age: 54
End: 2024-02-14
Payer: COMMERCIAL

## 2024-02-14 ENCOUNTER — OFFICE VISIT (OUTPATIENT)
Dept: ENDOCRINOLOGY | Facility: CLINIC | Age: 54
End: 2024-02-14
Payer: COMMERCIAL

## 2024-02-14 VITALS
WEIGHT: 212.2 LBS | BODY MASS INDEX: 28.74 KG/M2 | SYSTOLIC BLOOD PRESSURE: 124 MMHG | TEMPERATURE: 97.7 F | DIASTOLIC BLOOD PRESSURE: 75 MMHG | HEART RATE: 100 BPM | RESPIRATION RATE: 16 BRPM | OXYGEN SATURATION: 96 % | HEIGHT: 72 IN

## 2024-02-14 VITALS
HEART RATE: 102 BPM | WEIGHT: 213 LBS | DIASTOLIC BLOOD PRESSURE: 67 MMHG | BODY MASS INDEX: 29.29 KG/M2 | SYSTOLIC BLOOD PRESSURE: 106 MMHG

## 2024-02-14 DIAGNOSIS — E11.21 MACROALBUMINURIC DIABETIC NEPHROPATHY (MULTI): Primary | ICD-10-CM

## 2024-02-14 DIAGNOSIS — E83.52 HYPERCALCEMIA: ICD-10-CM

## 2024-02-14 DIAGNOSIS — R80.9 DIABETES MELLITUS WITH MICROALBUMINURIA (MULTI): ICD-10-CM

## 2024-02-14 DIAGNOSIS — M1A.00X0 IDIOPATHIC CHRONIC GOUT WITHOUT TOPHUS, UNSPECIFIED SITE: ICD-10-CM

## 2024-02-14 DIAGNOSIS — E11.29 DIABETES MELLITUS WITH MICROALBUMINURIA (MULTI): ICD-10-CM

## 2024-02-14 DIAGNOSIS — E11.69 TYPE 2 DIABETES MELLITUS WITH OTHER SPECIFIED COMPLICATION, WITHOUT LONG-TERM CURRENT USE OF INSULIN (MULTI): ICD-10-CM

## 2024-02-14 DIAGNOSIS — I10 PRIMARY HYPERTENSION: ICD-10-CM

## 2024-02-14 DIAGNOSIS — E78.5 DYSLIPIDEMIA: ICD-10-CM

## 2024-02-14 DIAGNOSIS — R80.9 MICROALBUMINURIA: ICD-10-CM

## 2024-02-14 DIAGNOSIS — E11.00 TYPE 2 DIABETES MELLITUS WITH HYPEROSMOLARITY WITHOUT COMA, WITHOUT LONG-TERM CURRENT USE OF INSULIN (MULTI): ICD-10-CM

## 2024-02-14 PROCEDURE — 1036F TOBACCO NON-USER: CPT | Performed by: INTERNAL MEDICINE

## 2024-02-14 PROCEDURE — 99214 OFFICE O/P EST MOD 30 MIN: CPT | Performed by: INTERNAL MEDICINE

## 2024-02-14 PROCEDURE — 3078F DIAST BP <80 MM HG: CPT | Performed by: INTERNAL MEDICINE

## 2024-02-14 PROCEDURE — 3060F POS MICROALBUMINURIA REV: CPT | Performed by: INTERNAL MEDICINE

## 2024-02-14 PROCEDURE — 3008F BODY MASS INDEX DOCD: CPT | Performed by: INTERNAL MEDICINE

## 2024-02-14 PROCEDURE — 4010F ACE/ARB THERAPY RXD/TAKEN: CPT | Performed by: INTERNAL MEDICINE

## 2024-02-14 PROCEDURE — 3074F SYST BP LT 130 MM HG: CPT | Performed by: INTERNAL MEDICINE

## 2024-02-14 RX ORDER — GLIPIZIDE 5 MG/1
5 TABLET ORAL
Qty: 180 TABLET | Refills: 3 | Status: SHIPPED | OUTPATIENT
Start: 2024-02-14 | End: 2025-02-13

## 2024-02-14 ASSESSMENT — ENCOUNTER SYMPTOMS
CONSTIPATION: 0
SORE THROAT: 0
DIFFICULTY URINATING: 0
DIARRHEA: 0
VOMITING: 0
NAUSEA: 1
SHORTNESS OF BREATH: 0
ENDOCRINE COMMENTS: AS ABOVE
UNEXPECTED WEIGHT CHANGE: 0
FREQUENCY: 0

## 2024-02-14 NOTE — PROGRESS NOTES
Subjective       Luis Manuel Moore is a 53 y.o. male who has past medical history of DM +10 years, Gout - last attack 15 + yrs, hyperlipedemia and obesity who is being evaluated today for CKD/albuminuria management    Last visit August 2023.  We followed conservative measures with RAAS inhibitor/SGL inhibitors/finerenone.  Today, Luis Manuel came alone.  He recently started Ozempic for diabetes control was successful weight loss.  Repeat blood work showed stable excellent serum creatinine.  Albuminuria significantly improved from 0.7 g down to 85 mg/g per spot test ACR.  A1c 6.7 on Ozempic and metformin.  No kidney related complaints.  No urinary tract symptoms.  No leg swelling or shortness of breath.      Prior notes  Last office visit May 2023. We increase that range from 10-20 to address proteinuric diabetic nephropathy. Luis Manuel came alone today. He forgot to get blood and urine analysis done prior to the visit as instructed. No kidney related complaints or concerns. Blood pressure is excellent. With adherence to medication. Continues to work with endocrinology for better diabetes management. He was recommended to start Ozempic-encouraged to start for better diabetes control and kidney benefits and weight loss     Per prior notes     Last office visit January 2023. We started Kerendia for persistent proteinuria. In the interim, he had shoulder surgery for frozen shoulder and he had foot injury that he is dealing with. Occasional NSAID use for pain. He follows low-salt diet as possible. He had blood work done recently and results were discussed with him in details including excellent serum creatinine 0.8 and GFR above 90. Within normal electrolytes and potassium. Albuminuria is worsening up to 700 from 500 mg earlier. Uric acid is normal. He is adherent to blood pressure medication, Farxiga, Kerendia and lisinopril. Blood pressure today is accepted. No swelling or shortness of breath     Per prior notes     Last office visit  January 2022. He has been adherent to medications including Farxiga, Metformin, lisinopril 10 mg. He raised some concern over prior elevated HR in the 100s but in office was 84. Still endorsing some foam in his urine. Has had bouts of abstinence from drinking soda, but cannot remain without it for long periods of time. Blood work discussed with Luis Manuel. Album/creat ratio of 0.5 g/g is elevated from 0.29 g/g in November 2022. Serum creatinine remains WNL. A1C has remained stable at 7. Discussed with him starting Kerendia for persistent protein leakage and the importance of monitoring BP while on this with lisinopril. A conversation was had about monitoring potassium because of Kerendia and lisinopril. Denies hx of kidney stones.         Per prior notes     Last office visit January 2021. In the interim, no events. He continues to be adherent to medications including Farxiga, Metformin, lisinopril 5 mg. He does not check blood pressure at home but always good per office visits. He occasionally notes foam in the urine. No leg swelling or shortness of breath. No change in weight recently. He was diagnosed with COVID-19 infection in December 2021 and recovered well. He is scheduled to get his booster dose in few weeks. I discussed blood work results with Luis Manuel today including renal function panel and urine analysis. Albuminuria is stable about 0.5 g/g albumin creatinine issue-asymptomatic. Repeat kidney function panel showed excellent normal serum creatinine     Per prior notes     LOV 7/2020- we started Farxiga and Atorva. No AEs. Albuminuria improved and kidney function is excellent. He follows healthy life style now. No C/O or concerns today. He follows with hepatology for fatty liver. Luis Manuel is feeling very well today. Plans to go to [place] in July in a family trip. 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 No kidney issues  Social: he works as a , , likes to [employer], never smoker, social wine no drugs  Sx Hx : CTS     Objective   /75 (BP Location: Right arm, Patient Position: Standing, BP Cuff Size: Adult)   Pulse 100   Temp 36.5 °C (97.7 °F)   Resp 16   Ht 1.822 m (5' 11.75\")   Wt 96.3 kg (212 lb 3.2 oz)   SpO2 96%   BMI 28.98 kg/m²   Wt Readings from Last 3 Encounters:   02/14/24 96.3 kg (212 lb 3.2 oz)   02/14/24 96.6 kg (213 lb)   12/20/23 95.3 kg (210 lb 3.2 oz)       Physical Exam    General appearance: no distress awake and alert on room air, euvolemic on exam  Eyes: non-icteric  HEENT: atrumatic head, PEERLA, moist mucosa  Skin: no apparent rash  Heart: NSR, S1, S2 normal, no murmur or gallop  Lungs: Symmetrical expansion,CTA bilat no wheezing/crackles  Abdomen: soft, nt/nd, obese  Extremities: no edema bilat  Neuro: No FND,asterixis, no focal deficits noticed        Review of Systems     Constitutional: no fever, no chills, no recent weight gain and no recent weight loss.   Eyes: no blurred vision and no diplopia.   ENT: no hearing loss, no earache, no sore throat, no swollen glands in the neck and no nasal discharge.   Cardiovascular: no chest pain, no palpitations and no lower extremity edema.   Respiratory: no shortness of breath, no chronic cough and no shortness of breath during exertion.   Gastrointestinal: no abdominal pain, no constipation, no heartburn, no vomiting, no bloody stools and no change in bowel movements.   Genitourinary: no dysuria and no hematuria.   Musculoskeletal: no arthralgias and no myalgias.   Skin: no rashes and no skin lesions.   Neurological: no headaches and no dizziness. "   Psychiatric: no confusion, no depression and no anxiety.   Endocrine: no heat intolerance, no cold intolerance, appetite not increased, no thyroid disorder, no increased urinary frequency and no dry skin.   Hematologic/Lymphatic: does not bleed easily and does not bruise easily.   All other systems have been reviewed and are negative for complaint.         Data Review                   Lab Results   Component Value Date    URICACID 4.8 02/10/2024           Lab Results   Component Value Date    HGBA1C 6.7 (H) 12/19/2023           Results from last 7 days   Lab Units 02/10/24  1052   SODIUM mmol/L 135*   POTASSIUM mmol/L 4.6   CHLORIDE mmol/L 98   CO2 mmol/L 27   BUN mg/dL 14   GLUCOSE mg/dL 98   CALCIUM mg/dL 9.9   ANION GAP mmol/L 15   EGFR mL/min/1.73m*2 >90           Albumin/Creatine Ratio   Date Value Ref Range Status   02/10/2024 84.5 (H) <30.0 ug/mg Creat Final   12/19/2023 168.7 (H) <30.0 ug/mg Creat Final   08/09/2023 448.9 (H) 0.0 - 30.0 ug/mg crt Final            RFP  Recent Labs     02/10/24  1052 12/19/23  1031 08/09/23  1025 06/21/23  0715 04/27/23  0900 03/07/23  1627 01/18/23  0808 05/16/22  0823 01/22/22  0851 05/13/21  0839 01/19/21  0744   * 137 134* 136 138 133 137   < > 140   < > 138   K 4.6 4.6 4.5 4.4 4.1 4.5 4.8   < > 4.6   < > 4.6   CL 98 95* 98 101 99 97 99   < > 100   < > 101   CO2 27 31 27 25 28 25 28   < > 27   < > 28   BUN 14 16 16 18 12 15 17   < > 13   < > 16   CREATININE 0.85 1.02 0.83 0.96 0.84 0.9 0.89   < > 0.74   < > 0.90   GLUCOSE 98 104* 141* 180* 130* 203* 164*   < > 150*   < > 119*   CALCIUM 9.9 11.0* 9.7 9.7 10.1 9.3 10.0   < > 10.2   < > 10.3   PHOS 3.6  --  3.1  --  3.1  --  3.8  --  3.4  --  4.6   EGFR >90 87  --   --   --  101  --   --   --   --   --    ANIONGAP 15 16 14 14 15 11 15   < > 18   < > 14    < > = values in this interval not displayed.        Urineanalysis  Recent Labs     01/22/22  0851 01/19/21  0738   PROTUR 52* 109*       Urine Electrolytes  Recent  "Labs     02/10/24  1052 12/19/23  1031 08/09/23  1025 04/27/23  0900 01/18/23  0808 11/15/22  0808 01/22/22  0851 11/16/21  0859 01/19/21  0738 03/10/20  0853 09/10/19  0816 02/26/19  1336 02/22/18  0914   CREATU 70.5 165.0 79.1 85.8 67.0 144.0 83.7  83.7 96.0 205.0  205.0 130.0 90.8 211.0 204.0   PROTUR  --   --   --   --   --   --  52*  --  109*  --   --   --   --    UTPCR  --   --   --   --   --   --  0.62*  --  0.53*  --   --   --   --    ALBUMINUR 59.6 278.4  --   --   --   --   --   --   --   --   --   --   --    MICROALBCREA 84.5* 168.7* 448.9* 709.9* 519.6* 290.8* 424.7* 593.4* 360.8* 688.4* 730.9* SEE COMMENT 494.6*        Urine Micro  No results for input(s): \"WBCU\", \"RBCU\", \"HYALCASTU\", \"SQUAMEPIU\", \"BACTERIAU\", \"MUCUSU\" in the last 37170 hours.     Iron  Recent Labs     08/25/20  1011   IRON 69   TIBC 376   IRONSAT 18*   FERRITIN 107          Current Outpatient Medications on File Prior to Visit   Medication Sig Dispense Refill    allopurinol (Zyloprim) 300 mg tablet Take 1 tablet (300 mg) by mouth once daily. 90 tablet 1    aspirin 81 mg chewable tablet Chew 1 tablet (81 mg).      atorvastatin (Lipitor) 40 mg tablet Take 1 tablet (40 mg) by mouth once daily at bedtime. 90 tablet 2    blood sugar diagnostic (OneTouch Verio test strips) strip For glucose testing once daily 100 strip 3    blood-glucose meter (OneTouch Verio Flex meter) misc For glucose testing once daily 1 each 0    cetirizine (ZYRTEC) 10 mg capsule Take 1 capsule (10 mg) by mouth if needed.      Farxiga 10 mg Take 1 tablet (10 mg) by mouth once daily in the morning. Take before meals. 90 tablet 1    Kerendia 20 mg tablet Take 1 tablet (20 mg) by mouth once daily.      lancets (OneTouch Delica Plus Lancet) 33 gauge misc For glucose testing once daily 100 each 3    lisinopril 10 mg tablet Take 1 tablet (10 mg) by mouth once daily. 90 tablet 2    metFORMIN (Glucophage) 1,000 mg tablet Take 1 tablet (1,000 mg) by mouth 2 times a day. 180 " tablet 2    montelukast (Singulair) 10 mg tablet Take 1 tablet (10 mg) by mouth once daily at bedtime.      semaglutide (Ozempic) 1 mg/dose (4 mg/3 mL) pen injector Inject 1 mg under the skin every 7 days. 9 mL 3    [DISCONTINUED] glipiZIDE (Glucotrol) 5 mg tablet Take 1 tablet (5 mg) by mouth 2 times a day before meals. 180 tablet 2     No current facility-administered medications on file prior to visit.           Assessment and Plan       Luis Manuel Moore  is a 53 y.o. male who has past medical history of DM +10 years, Gout - last attack 15 yrs, hyperlipedemia and obesity who is being evaluated today for CKD/albuminuria management     Impression   # Proteinuria /Albuminuria - most likely diabetic nephropathy-worsening  -Albumin creatinine ratio 0. 7 g/g improved and currently 84 mg/g  - Blood pressure : Is in target on lisinopril 10 mg, Kerendia 20  - Discussed in details importance of DM control, and negative effect of albuminuria on CVD  - Will continue Farxiga for DM/albuminuria management and CVS benefits, will continue Atorva for increased CVS risk and continue Metformin 1000 mg x2 daily.  Will continue Ozempic for cardiovascular benefits  - Limit salt intake  - Monitor potassium in diet - decrease foods such as: spinach, broccoli, tomatoes, and bananas  - No smoking  -Normal serum creatinine 0.8 and within normal electrolytes       f/u in 12 months with repeat blood work and urinalysis prior to next visit.            Tammy Hodgson MD, MS, RADHA TOM  Clinical  - [facility] [employer]  Nephrologist - [facility]

## 2024-02-14 NOTE — PROGRESS NOTES
History Of Present Illness  Luis Manuel Moore is a 53 y.o. male     Duration of type 2 diabetes mellitus:  8 years  Complications:  albuminuria     No further weight loss    Ozempic 1 mg/week  Metformin 1000 mg BID  Farxiga 10 mg/day  Glipizide 5 mg BID    Patient is testing glucose 1 time daily  Records reviewed, on file     Last eye exam:  April 2023    Past Medical History  He has a past medical history of Achilles tendinitis, unspecified leg (05/26/2021), Elevation of levels of liver transaminase levels (02/27/2019), Elevation of levels of liver transaminase levels (06/06/2018), Personal history of other diseases of the circulatory system, Personal history of other diseases of the respiratory system (05/26/2021), Personal history of other endocrine, nutritional and metabolic disease, and Strain of unspecified achilles tendon, initial encounter (05/26/2021).    Surgical History  He has a past surgical history that includes Carpal tunnel release (03/16/2015) and US guided needle liver biopsy (12/7/2020).     Social History  He reports that he has never smoked. He has never used smokeless tobacco. No history on file for alcohol use and drug use.    Family History  Family History   Problem Relation Name Age of Onset    Diabetes Father      Other (cardiac disorder) Other      Hypertension Other         Medications  Current Outpatient Medications   Medication Instructions    allopurinol (ZYLOPRIM) 300 mg, oral, Daily    aspirin 81 mg, oral    atorvastatin (LIPITOR) 40 mg, oral, Nightly    blood sugar diagnostic (OneTouch Verio test strips) strip For glucose testing once daily    blood-glucose meter (OneTouch Verio Flex meter) misc For glucose testing once daily    cetirizine (ZYRTEC) 10 mg capsule 1 capsule, oral, As needed    Farxiga 10 mg, oral, Daily before breakfast    glipiZIDE (GLUCOTROL) 5 mg, oral, 2 times daily before meals    Kerendia 20 mg tablet 1 tablet, oral, Daily    lancets (OneTouch Delica Plus Lancet) 33  gauge misc For glucose testing once daily    lisinopril 10 mg, oral, Daily    metFORMIN (GLUCOPHAGE) 1,000 mg, oral, 2 times daily    montelukast (Singulair) 10 mg tablet 1 tablet, oral, Nightly    Ozempic 1 mg, subcutaneous, Every 7 days       Allergies  Doxycycline    Review of Systems   Constitutional:  Negative for unexpected weight change.   HENT:  Negative for sore throat.    Eyes:  Negative for visual disturbance.   Respiratory:  Negative for shortness of breath.    Cardiovascular:  Negative for chest pain.   Gastrointestinal:  Positive for nausea (2 days after Ozempic shot). Negative for constipation, diarrhea and vomiting.   Endocrine:        As above   Genitourinary:  Negative for difficulty urinating and frequency.         Last Recorded Vitals  Blood pressure 106/67, pulse 102, weight 96.6 kg (213 lb).    Physical Exam  HENT:      Head: Normocephalic.   Eyes:      Pupils: Pupils are equal, round, and reactive to light.   Neck:      Thyroid: No thyromegaly.   Cardiovascular:      Pulses:           Radial pulses are 2+ on the right side and 2+ on the left side.   Musculoskeletal:      Right lower leg: No edema.      Left lower leg: No edema.   Lymphadenopathy:      Cervical: No cervical adenopathy.   Neurological:      Mental Status: He is alert.      Motor: No tremor.   Psychiatric:         Mood and Affect: Affect normal.          Relevant Results  Glucose (mg/dL)   Date Value   02/10/2024 98   12/19/2023 104 (H)   08/09/2023 141 (H)   06/21/2023 180 (H)   04/27/2023 130 (H)     TR HGBA1C (Data Conversion) (%)   Date Value   07/08/2020 8.6 (H)   03/10/2020 9.4 (H)   12/10/2019 7.6 (H)     POC HEMOGLOBIN A1c (%)   Date Value   10/30/2023 6.9 (A)     Hemoglobin A1C (%)   Date Value   12/19/2023 6.7 (H)   06/21/2023 8.1 (A)   11/10/2021 7.0 (A)   05/13/2021 7.2     Bicarbonate (mmol/L)   Date Value   02/10/2024 27   12/19/2023 31   08/09/2023 27   06/21/2023 25   04/27/2023 28     Urea Nitrogen (mg/dL)   Date  Value   02/10/2024 14   12/19/2023 16   08/09/2023 16   06/21/2023 18   04/27/2023 12     Creatinine (mg/dL)   Date Value   02/10/2024 0.85   12/19/2023 1.02   08/09/2023 0.83   06/21/2023 0.96   04/27/2023 0.84     Lab Results   Component Value Date    CHOL 90 12/19/2023    CHOL 92 06/21/2023    CHOL 96 11/15/2022     Lab Results   Component Value Date    HDL 32.6 12/19/2023    HDL 26.4 (A) 06/21/2023    HDL 33.0 (A) 11/15/2022     Lab Results   Component Value Date    LDLCALC 7 12/19/2023     Lab Results   Component Value Date    TRIG 253 (H) 12/19/2023    TRIG 440 (H) 06/21/2023    TRIG 202 (H) 11/15/2022     Lab Results   Component Value Date    TSH 1.48 04/27/2023       IMPRESSION  TYPE 2 DIABETES MELLITUS  Glucose well controlled  Tolerating Ozempic      RECOMMENDATIONS  Continue current program    Follow up 6 months  A1c at next appointment if not already done.

## 2024-02-14 NOTE — PATIENT INSTRUCTIONS
Dear NICOLLE   It was nice seeing you in the nephrology clinic today     Today we discussed the following:     # You are leaking protein/Albumin most likely due to diabetes.  Protein leak improved and now down from 0.7 g to almost none  -Continue currently Kerendia 20 mg, continuing Farxiga milligram and lisinopril 10 mg.  Your kidney function is excellent based on blood work done in February 2024  - Watch diet and increase exercise and physical activity   - Continue Metformin to 1000 mg x2 daily - before meals  -Continue lisinopril 10 mg. Check your blood pressure 2-3 times weekly.  - Continue Farxiga - makes you urinate sugar so watch for infection, don't hold urine in bladder   - Continue Atorvastatin for heart protection  -Continue Ozempic for diabetes control and heart/kidney health and weight loss              Up in 12 months with repeat blood work and urinalysis prior to next visit     Please call our office if you have any question  Thank you for coming to see me today     Tammy Hodgson MD, MS, RADHA TOM  Clinical  - [employer]  Nephrologist - [facility]

## 2024-02-14 NOTE — PATIENT INSTRUCTIONS
RECOMMENDATIONS  Continue current program    Follow up 6 months  A1c at next appointment if not already done.

## 2024-05-28 ENCOUNTER — TELEPHONE (OUTPATIENT)
Dept: PRIMARY CARE | Facility: CLINIC | Age: 54
End: 2024-05-28
Payer: COMMERCIAL

## 2024-05-30 DIAGNOSIS — E78.1 HYPERTRIGLYCERIDEMIA: Primary | ICD-10-CM

## 2024-06-01 ENCOUNTER — LAB (OUTPATIENT)
Dept: LAB | Facility: LAB | Age: 54
End: 2024-06-01
Payer: COMMERCIAL

## 2024-06-01 DIAGNOSIS — E78.1 HYPERTRIGLYCERIDEMIA: ICD-10-CM

## 2024-06-01 LAB
CHOLEST SERPL-MCNC: 81 MG/DL (ref 0–199)
CHOLESTEROL/HDL RATIO: 2.6
HDLC SERPL-MCNC: 31.3 MG/DL
LDLC SERPL CALC-MCNC: 11 MG/DL
NON HDL CHOLESTEROL: 50 MG/DL (ref 0–149)
TRIGL SERPL-MCNC: 192 MG/DL (ref 0–149)
VLDL: 38 MG/DL (ref 0–40)

## 2024-06-01 PROCEDURE — 36415 COLL VENOUS BLD VENIPUNCTURE: CPT

## 2024-06-01 PROCEDURE — 80061 LIPID PANEL: CPT

## 2024-06-03 ENCOUNTER — OFFICE VISIT (OUTPATIENT)
Dept: PRIMARY CARE | Facility: CLINIC | Age: 54
End: 2024-06-03
Payer: COMMERCIAL

## 2024-06-03 ENCOUNTER — LAB (OUTPATIENT)
Dept: LAB | Facility: LAB | Age: 54
End: 2024-06-03
Payer: COMMERCIAL

## 2024-06-03 ENCOUNTER — HOSPITAL ENCOUNTER (OUTPATIENT)
Dept: RADIOLOGY | Facility: CLINIC | Age: 54
Discharge: HOME | End: 2024-06-03
Payer: COMMERCIAL

## 2024-06-03 VITALS
SYSTOLIC BLOOD PRESSURE: 103 MMHG | WEIGHT: 206 LBS | HEIGHT: 71 IN | OXYGEN SATURATION: 96 % | HEART RATE: 98 BPM | BODY MASS INDEX: 28.84 KG/M2 | DIASTOLIC BLOOD PRESSURE: 71 MMHG

## 2024-06-03 DIAGNOSIS — G89.29 CHRONIC LEFT SHOULDER PAIN: ICD-10-CM

## 2024-06-03 DIAGNOSIS — M1A.0790 CHRONIC IDIOPATHIC GOUT INVOLVING TOE WITHOUT TOPHUS, UNSPECIFIED LATERALITY: ICD-10-CM

## 2024-06-03 DIAGNOSIS — E11.21 TYPE 2 DIABETES MELLITUS WITH DIABETIC NEPHROPATHY, WITHOUT LONG-TERM CURRENT USE OF INSULIN (MULTI): ICD-10-CM

## 2024-06-03 DIAGNOSIS — I10 PRIMARY HYPERTENSION: Primary | ICD-10-CM

## 2024-06-03 DIAGNOSIS — E78.1 HYPERTRIGLYCERIDEMIA, ESSENTIAL: ICD-10-CM

## 2024-06-03 DIAGNOSIS — E11.21 MACROALBUMINURIC DIABETIC NEPHROPATHY (MULTI): ICD-10-CM

## 2024-06-03 DIAGNOSIS — M25.512 CHRONIC LEFT SHOULDER PAIN: ICD-10-CM

## 2024-06-03 DIAGNOSIS — Z00.00 ANNUAL PHYSICAL EXAM: ICD-10-CM

## 2024-06-03 LAB
EST. AVERAGE GLUCOSE BLD GHB EST-MCNC: 148 MG/DL
HBA1C MFR BLD: 6.8 %

## 2024-06-03 PROCEDURE — 1036F TOBACCO NON-USER: CPT | Performed by: INTERNAL MEDICINE

## 2024-06-03 PROCEDURE — 99396 PREV VISIT EST AGE 40-64: CPT | Performed by: INTERNAL MEDICINE

## 2024-06-03 PROCEDURE — 3074F SYST BP LT 130 MM HG: CPT | Performed by: INTERNAL MEDICINE

## 2024-06-03 PROCEDURE — 4010F ACE/ARB THERAPY RXD/TAKEN: CPT | Performed by: INTERNAL MEDICINE

## 2024-06-03 PROCEDURE — 3078F DIAST BP <80 MM HG: CPT | Performed by: INTERNAL MEDICINE

## 2024-06-03 PROCEDURE — 73030 X-RAY EXAM OF SHOULDER: CPT | Mod: LEFT SIDE | Performed by: RADIOLOGY

## 2024-06-03 PROCEDURE — 3048F LDL-C <100 MG/DL: CPT | Performed by: INTERNAL MEDICINE

## 2024-06-03 PROCEDURE — 73030 X-RAY EXAM OF SHOULDER: CPT | Mod: LT

## 2024-06-03 PROCEDURE — 3008F BODY MASS INDEX DOCD: CPT | Performed by: INTERNAL MEDICINE

## 2024-06-03 PROCEDURE — 3060F POS MICROALBUMINURIA REV: CPT | Performed by: INTERNAL MEDICINE

## 2024-06-03 PROCEDURE — 83036 HEMOGLOBIN GLYCOSYLATED A1C: CPT

## 2024-06-03 PROCEDURE — 36415 COLL VENOUS BLD VENIPUNCTURE: CPT

## 2024-06-03 ASSESSMENT — PROMIS GLOBAL HEALTH SCALE
RATE_MENTAL_HEALTH: GOOD
EMOTIONAL_PROBLEMS: NEVER
RATE_AVERAGE_PAIN: 3
RATE_PHYSICAL_HEALTH: GOOD
RATE_QUALITY_OF_LIFE: VERY GOOD
RATE_SOCIAL_SATISFACTION: GOOD
CARRYOUT_PHYSICAL_ACTIVITIES: MOSTLY
CARRYOUT_SOCIAL_ACTIVITIES: GOOD
RATE_GENERAL_HEALTH: GOOD

## 2024-06-03 ASSESSMENT — PATIENT HEALTH QUESTIONNAIRE - PHQ9
SUM OF ALL RESPONSES TO PHQ9 QUESTIONS 1 AND 2: 0
1. LITTLE INTEREST OR PLEASURE IN DOING THINGS: NOT AT ALL
2. FEELING DOWN, DEPRESSED OR HOPELESS: NOT AT ALL

## 2024-06-03 ASSESSMENT — ENCOUNTER SYMPTOMS: CONSTITUTIONAL NEGATIVE: 1

## 2024-06-03 NOTE — PROGRESS NOTES
"Patient ID: Luis Manuel Moore is a 54 y.o. male who presents for Annual Exam (Patient here for complete physical exam).    /71   Pulse 98   Ht 1.803 m (5' 11\")   Wt 93.4 kg (206 lb)   SpO2 96%   BMI 28.73 kg/m²     HPI        HTN ON MEDICATIONS   NO CHEST PAIN , NO SOB , NO PND   NO ORTHOPNEA, NO LEG SWELLING   NO PALPITATION , NO HEADACHE       HYPERTRIGLYCERIDEMIA       DM 2 WITH MICROALBUMINURIA         CHRONIC GOUT       LEFT SHOULDER PAIN   NO INJURY OR TRAUMA   ONSET 5 MONTHS   I HAVE BEEN GOLFING         Subjective     Review of Systems   Constitutional: Negative.    All other systems reviewed and are negative.      Objective     Physical Exam  Vitals and nursing note reviewed.   Neck:      Vascular: No carotid bruit.   Cardiovascular:      Rate and Rhythm: Normal rate and regular rhythm.      Pulses: Normal pulses.      Heart sounds: Normal heart sounds.   Pulmonary:      Effort: Pulmonary effort is normal.      Breath sounds: Normal breath sounds.   Musculoskeletal:      Right lower leg: No edema.      Left lower leg: No edema.      Comments: LEFT SHOULDER ROM RESTRICTED   AND PAINFUL , NO SOFT TISSUE SWELLING   NO ERYTHEMA , NO WARTHM    Skin:     Capillary Refill: Capillary refill takes more than 3 seconds.   Neurological:      General: No focal deficit present.      Mental Status: He is oriented to person, place, and time. Mental status is at baseline.   Psychiatric:         Mood and Affect: Mood normal.         Behavior: Behavior normal.         Thought Content: Thought content normal.         Judgment: Judgment normal.         No results found for: \"WBC\", \"HGB\", \"HCT\", \"MCV\", \"PLT\"        Problem List Items Addressed This Visit       Annual physical exam    Primary hypertension - Primary    Hypertriglyceridemia, essential    Chronic gout    Macroalbuminuric diabetic nephropathy (Multi)    Type 2 diabetes mellitus (Multi)    Relevant Orders    Hemoglobin A1c     Other Visit Diagnoses       Chronic " left shoulder pain        Relevant Orders    XR shoulder left 2+ views    Referral to Orthopaedic Surgery                A/P         XR LEFT SHOULDER   A1C   REFFERAL ORTHOPEDIC SHOULDER   FORM COMPLETED   F/U IF NEEDED

## 2024-06-09 DIAGNOSIS — R80.9 DIABETES MELLITUS WITH MICROALBUMINURIA (MULTI): Primary | ICD-10-CM

## 2024-06-09 DIAGNOSIS — M1A.9XX0 CHRONIC GOUT WITHOUT TOPHUS, UNSPECIFIED CAUSE, UNSPECIFIED SITE: ICD-10-CM

## 2024-06-09 DIAGNOSIS — E11.29 DIABETES MELLITUS WITH MICROALBUMINURIA (MULTI): Primary | ICD-10-CM

## 2024-06-10 RX ORDER — FINERENONE 20 MG/1
20 TABLET, FILM COATED ORAL DAILY
Qty: 30 TABLET | Refills: 0 | Status: SHIPPED | OUTPATIENT
Start: 2024-06-10

## 2024-06-11 RX ORDER — ALLOPURINOL 300 MG/1
300 TABLET ORAL DAILY
Qty: 90 TABLET | Refills: 1 | Status: SHIPPED | OUTPATIENT
Start: 2024-06-11

## 2024-06-18 ENCOUNTER — OFFICE VISIT (OUTPATIENT)
Dept: ORTHOPEDIC SURGERY | Facility: CLINIC | Age: 54
End: 2024-06-18
Payer: COMMERCIAL

## 2024-06-18 DIAGNOSIS — M75.02 ADHESIVE CAPSULITIS OF LEFT SHOULDER: Primary | ICD-10-CM

## 2024-06-18 DIAGNOSIS — M25.512 CHRONIC LEFT SHOULDER PAIN: ICD-10-CM

## 2024-06-18 DIAGNOSIS — G89.29 CHRONIC LEFT SHOULDER PAIN: ICD-10-CM

## 2024-06-18 PROCEDURE — 99214 OFFICE O/P EST MOD 30 MIN: CPT

## 2024-06-18 PROCEDURE — 3044F HG A1C LEVEL LT 7.0%: CPT

## 2024-06-18 PROCEDURE — 2500000005 HC RX 250 GENERAL PHARMACY W/O HCPCS

## 2024-06-18 PROCEDURE — 3008F BODY MASS INDEX DOCD: CPT

## 2024-06-18 PROCEDURE — 99214 OFFICE O/P EST MOD 30 MIN: CPT | Mod: 25

## 2024-06-18 PROCEDURE — 20610 DRAIN/INJ JOINT/BURSA W/O US: CPT

## 2024-06-18 PROCEDURE — 3060F POS MICROALBUMINURIA REV: CPT

## 2024-06-18 PROCEDURE — 4010F ACE/ARB THERAPY RXD/TAKEN: CPT

## 2024-06-18 PROCEDURE — 2500000004 HC RX 250 GENERAL PHARMACY W/ HCPCS (ALT 636 FOR OP/ED)

## 2024-06-18 PROCEDURE — 3048F LDL-C <100 MG/DL: CPT

## 2024-06-18 RX ORDER — LIDOCAINE HYDROCHLORIDE 10 MG/ML
4 INJECTION INFILTRATION; PERINEURAL
Status: COMPLETED | OUTPATIENT
Start: 2024-06-18 | End: 2024-06-18

## 2024-06-18 ASSESSMENT — PAIN - FUNCTIONAL ASSESSMENT: PAIN_FUNCTIONAL_ASSESSMENT: 0-10

## 2024-06-18 ASSESSMENT — PAIN SCALES - GENERAL: PAINLEVEL_OUTOF10: 1

## 2024-06-18 NOTE — PROGRESS NOTES
Luis Manuel Moore is a 54 y.o. year-old  male  he is an established patient and presents with a chief complaint of Left shoulder pain. Previously seen in our office for his right shoulder and had surgical intervention to address adhesive capsulitis. Notes improvement in his right shoulder. Over the past 6 months he believes he has developed frozen shoulder now in his left as his symptoms are the same. No new injury. Pain is located to the top of the shoulder. Admits to pain with raising arm over head, pain with laying on shoulder and some weakness of the shoulder. Denies injury, night pain, neck pain, numbness/tingling down the arm, prior injury or surgery to this shoulder. He did formal PT for his right and has been doing his PT exercises now on his left. Treatment to date includes activity modification/PT/NSAIDs without adequate relief.       Past Medical, Family, and Social History reviewed     Review of Systems  A complete review of systems was conducted, pertinent only to the HPI noted above.  Constitutional: None  Eyes: No additions to above history  Ears, Nose, Throat: No additions to above history  Cardiovascular: No additions to above history  Respiratory: No additions to above history  GI: No additions to above history  : No additions to above history  Skin/Neuro: No additions to above history  Endocrine/Heme/Lymph: No additions to above history  Immunologic: No additions to above history  Psychiatric: No additions to above history  Musculoskeletal: see above  Physical Exam  GEN: Alert and Oriented x 3  Constitutional: Well appearing, in no apparent distress.  Eyes: sclera anicteric  ENT: hearing appropriate for normal conversation, neck appears symmetric with no gross thyromegaly  Pulm: No labored breathing, no wheezing  CVS: Regular rate and rhythm  PSY: normal mood and affect  Skin: No rashes, erythema, or induration around shoulder     Focused Musculoskeletal Exam:     Side: Left shoulder:  PROM:   FE  (160)   ER 40   AROM:   FE (150)   ER 30 IR L5  Strength:  Supra [4+/5] Infra [5/5] Subscap [5/5]  Abd [5/5]    Special Tests  Shoulder  Impingement Tests:  Neer + Preston +      ACJ:  AC TTP: [neg]  Cross Arm [neg]  AC prominence [no]      [Sensation intact Ax/median/ulnar/radial distributions  Motor intact Ax/median/radial/ulnar/AIN/PIN    X-rays of the shoulder independently viewed and interpreted: humeral head well aligned within glenoid. Joint spaces maintained. No fracture or dislocation.     L Inj/Asp: L glenohumeral on 6/18/2024 9:28 AM  Details: 22 G needle, posterior approach  Medications: 40 mg triamcinolone acetonide 10 mg/mL; 4 mL lidocaine 10 mg/mL (1 %)  Outcome: tolerated well, no immediate complications  Procedure, treatment alternatives, risks and benefits explained, specific risks discussed. Consent was given by the patient. Patient was prepped and draped in the usual sterile fashion.            1. Adhesive capsulitis of left shoulder        2. Chronic left shoulder pain  Referral to Orthopaedic Surgery           The patient history, physical examination and imaging studies are consistent with the diagnosis above.    After a thorough discussion with the patient including expectations, I would recommend we continue a conservative program for now.  We discussed home/formal PT (deltoid isometrics, RTC strengthening, scapular stabilizers, stretching) and activity modification including avoidance of the positions and activities that provoke symptoms, including athletics.  We also discussed the ice and NSAIDS/tylenol. I provided a steroid injection today at his request. If they do not improve we'll get an MRI. He will call us if this do not improve.     Repeat injection 3 months if indicated.

## 2024-07-14 DIAGNOSIS — R80.9 DIABETES MELLITUS WITH MICROALBUMINURIA (MULTI): ICD-10-CM

## 2024-07-14 DIAGNOSIS — E11.29 DIABETES MELLITUS WITH MICROALBUMINURIA (MULTI): ICD-10-CM

## 2024-07-15 RX ORDER — FINERENONE 20 MG/1
20 TABLET, FILM COATED ORAL DAILY
Qty: 30 TABLET | Refills: 0 | Status: SHIPPED | OUTPATIENT
Start: 2024-07-15

## 2024-08-06 ENCOUNTER — APPOINTMENT (OUTPATIENT)
Dept: ENDOCRINOLOGY | Facility: CLINIC | Age: 54
End: 2024-08-06
Payer: COMMERCIAL

## 2024-08-06 ENCOUNTER — HOSPITAL ENCOUNTER (OUTPATIENT)
Dept: CARDIOLOGY | Facility: HOSPITAL | Age: 54
Discharge: HOME | End: 2024-08-06
Payer: COMMERCIAL

## 2024-08-06 VITALS
HEART RATE: 124 BPM | BODY MASS INDEX: 28.31 KG/M2 | SYSTOLIC BLOOD PRESSURE: 101 MMHG | WEIGHT: 203 LBS | DIASTOLIC BLOOD PRESSURE: 67 MMHG

## 2024-08-06 DIAGNOSIS — R00.0 TACHYCARDIA: ICD-10-CM

## 2024-08-06 DIAGNOSIS — E11.9 TYPE 2 DIABETES MELLITUS WITHOUT COMPLICATION, WITHOUT LONG-TERM CURRENT USE OF INSULIN (MULTI): Primary | ICD-10-CM

## 2024-08-06 LAB
ATRIAL RATE: 118 BPM
P AXIS: 65 DEGREES
P OFFSET: 178 MS
P ONSET: 132 MS
PR INTERVAL: 174 MS
Q ONSET: 219 MS
QRS COUNT: 19 BEATS
QRS DURATION: 118 MS
QT INTERVAL: 322 MS
QTC CALCULATION(BAZETT): 451 MS
QTC FREDERICIA: 403 MS
R AXIS: 90 DEGREES
T AXIS: 37 DEGREES
T OFFSET: 380 MS
VENTRICULAR RATE: 118 BPM

## 2024-08-06 PROCEDURE — 3044F HG A1C LEVEL LT 7.0%: CPT | Performed by: INTERNAL MEDICINE

## 2024-08-06 PROCEDURE — 3074F SYST BP LT 130 MM HG: CPT | Performed by: INTERNAL MEDICINE

## 2024-08-06 PROCEDURE — 3048F LDL-C <100 MG/DL: CPT | Performed by: INTERNAL MEDICINE

## 2024-08-06 PROCEDURE — 93005 ELECTROCARDIOGRAM TRACING: CPT

## 2024-08-06 PROCEDURE — 1036F TOBACCO NON-USER: CPT | Performed by: INTERNAL MEDICINE

## 2024-08-06 PROCEDURE — 3078F DIAST BP <80 MM HG: CPT | Performed by: INTERNAL MEDICINE

## 2024-08-06 PROCEDURE — 99214 OFFICE O/P EST MOD 30 MIN: CPT | Performed by: INTERNAL MEDICINE

## 2024-08-06 PROCEDURE — 3060F POS MICROALBUMINURIA REV: CPT | Performed by: INTERNAL MEDICINE

## 2024-08-06 PROCEDURE — 4010F ACE/ARB THERAPY RXD/TAKEN: CPT | Performed by: INTERNAL MEDICINE

## 2024-08-06 ASSESSMENT — ENCOUNTER SYMPTOMS
FATIGUE: 1
FREQUENCY: 0
SORE THROAT: 0
ABDOMINAL PAIN: 0
SHORTNESS OF BREATH: 0
ARTHRALGIAS: 0
VOMITING: 0
DIARRHEA: 0
APPETITE CHANGE: 0
HEADACHES: 0
POLYDIPSIA: 0
COUGH: 0
FEVER: 0
NAUSEA: 0
NERVOUS/ANXIOUS: 0
CONSTIPATION: 0

## 2024-08-06 NOTE — PATIENT INSTRUCTIONS
RECOMMENDATIONS  EKG Today    Follow up with Dr. Richardson  Follow pulse  If chest pain or shortness of breath, go to ED    Follow up 6 months

## 2024-08-06 NOTE — LETTER
August 6, 2024     Vangie Richardson MD  [address]  [address]  [facility] [address]    Patient: Luis Manuel Moore   YOB: 1970   Date of Visit: 8/6/2024       Dear Dr. Vangie Richardson MD:    Thank you for referring Luis Manuel Moore to me for evaluation. Below are my notes for this consultation.  If you have questions, please do not hesitate to call me. I look forward to following your patient along with you.       Sincerely,     Rupesh Dickinson MD      CC: No Recipients  ______________________________________________________________________________________    History Of Present Illness  Luis Manuel Moore is a 54 y.o. male     Duration of type 2 diabetes mellitus:  9 years  Complications:  albuminuria     Fatigue since golf 2 days ago.   No chest symptoms  No palpitations    Ozempic 1 mg/week  Metformin 1000 mg BID  Farxiga 10 mg/day  Glipizide 5 mg BID     Patient is testing glucose 1 time daily  Records reviewed, on file     Last eye exam:  April 2023    Past Medical History  He has a past medical history of Achilles tendinitis, unspecified leg (05/26/2021), Elevation of levels of liver transaminase levels (02/27/2019), Elevation of levels of liver transaminase levels (06/06/2018), Personal history of other diseases of the circulatory system, Personal history of other diseases of the respiratory system (05/26/2021), Personal history of other endocrine, nutritional and metabolic disease, and Strain of unspecified achilles tendon, initial encounter (05/26/2021).    Surgical History  He has a past surgical history that includes Carpal tunnel release (03/16/2015) and US guided needle liver biopsy (12/7/2020).     Social History  He reports that he has never smoked. He has never used smokeless tobacco. No history on file for alcohol use and drug use.    Family History  Family History   Problem Relation Name Age of Onset   • Diabetes Father     • Other (cardiac disorder) Other     • Hypertension Other          Medications  Current Outpatient Medications   Medication Instructions   • allopurinol (ZYLOPRIM) 300 mg, oral, Daily   • aspirin 81 mg, oral   • atorvastatin (LIPITOR) 40 mg, oral, Nightly   • blood sugar diagnostic (OneTouch Verio test strips) strip For glucose testing once daily   • blood-glucose meter (OneTouch Verio Flex meter) misc For glucose testing once daily   • cetirizine (ZYRTEC) 10 mg capsule 1 capsule, oral, As needed   • Farxiga 10 mg, oral, Daily before breakfast   • glipiZIDE (GLUCOTROL) 5 mg, oral, 2 times daily before meals   • Kerendia 20 mg, oral, Daily   • lancets (OneTouch Delica Plus Lancet) 33 gauge misc For glucose testing once daily   • lisinopril 10 mg, oral, Daily   • metFORMIN (GLUCOPHAGE) 1,000 mg, oral, 2 times daily   • montelukast (Singulair) 10 mg tablet 1 tablet, oral, Nightly   • Ozempic 1 mg, subcutaneous, Every 7 days       Allergies  Doxycycline    Review of Systems   Constitutional:  Positive for fatigue. Negative for appetite change and fever.   HENT:  Negative for sore throat.         Denies dry mouth   Eyes:  Negative for visual disturbance.   Respiratory:  Negative for cough and shortness of breath.    Cardiovascular:  Negative for chest pain.   Gastrointestinal:  Negative for abdominal pain, constipation, diarrhea, nausea and vomiting.   Endocrine: Negative for polydipsia and polyuria.   Genitourinary:  Negative for frequency.   Musculoskeletal:  Negative for arthralgias.   Skin:  Negative for rash.   Neurological:  Negative for headaches.   Psychiatric/Behavioral:  The patient is not nervous/anxious.          Last Recorded Vitals  Blood pressure 101/67, pulse (!) 133, weight 92.1 kg (203 lb).    Physical Exam  Constitutional:       General: He is not in acute distress.  HENT:      Head: Normocephalic.      Mouth/Throat:      Mouth: Mucous membranes are moist.   Eyes:      Extraocular Movements: Extraocular movements intact.   Neck:      Thyroid: No thyroid mass or  thyromegaly.   Cardiovascular:      Rate and Rhythm: Regular rhythm. Tachycardia present.      Pulses:           Radial pulses are 2+ on the right side and 2+ on the left side.      Heart sounds: No murmur heard.  Musculoskeletal:      Right lower leg: No edema.      Left lower leg: No edema.   Lymphadenopathy:      Cervical: No cervical adenopathy.   Neurological:      Mental Status: He is alert.      Motor: No tremor.   Psychiatric:         Mood and Affect: Affect normal.          Relevant Results  Glucose (mg/dL)   Date Value   02/10/2024 98   12/19/2023 104 (H)   08/09/2023 141 (H)   06/21/2023 180 (H)   04/27/2023 130 (H)     TR HGBA1C (Data Conversion) (%)   Date Value   07/08/2020 8.6 (H)   03/10/2020 9.4 (H)   12/10/2019 7.6 (H)     POC HEMOGLOBIN A1c (%)   Date Value   10/30/2023 6.9 (A)     Hemoglobin A1C (%)   Date Value   06/03/2024 6.8 (H)   12/19/2023 6.7 (H)   06/21/2023 8.1 (A)   11/10/2021 7.0 (A)   05/13/2021 7.2     Bicarbonate (mmol/L)   Date Value   02/10/2024 27   12/19/2023 31   08/09/2023 27   06/21/2023 25   04/27/2023 28     Urea Nitrogen (mg/dL)   Date Value   02/10/2024 14   12/19/2023 16   08/09/2023 16   06/21/2023 18   04/27/2023 12     Creatinine (mg/dL)   Date Value   02/10/2024 0.85   12/19/2023 1.02   08/09/2023 0.83   06/21/2023 0.96   04/27/2023 0.84     Lab Results   Component Value Date    CHOL 81 06/01/2024    CHOL 90 12/19/2023    CHOL 92 06/21/2023     Lab Results   Component Value Date    HDL 31.3 06/01/2024    HDL 32.6 12/19/2023    HDL 26.4 (A) 06/21/2023     Lab Results   Component Value Date    LDLCALC 11 06/01/2024    LDLCALC 7 12/19/2023     Lab Results   Component Value Date    TRIG 192 (H) 06/01/2024    TRIG 253 (H) 12/19/2023    TRIG 440 (H) 06/21/2023     Lab Results   Component Value Date    TSH 1.48 04/27/2023       IMPRESSION  TYPE 2 DIABETES MELLITUS   Excellent glucose control    TACHYCARDIA  Patient presented with asymptomatic regular tachycardia  Similar  episode on presentation in February 2023      RECOMMENDATIONS  EKG Today    Follow up with Dr. Richardson  Follow pulse  If chest pain or shortness of breath, go to ED    Follow up 6 months

## 2024-08-06 NOTE — PROGRESS NOTES
History Of Present Illness  Luis Manuel Moore is a 54 y.o. male     Duration of type 2 diabetes mellitus:  9 years  Complications:  albuminuria     Fatigue since golf 2 days ago.   No chest symptoms  No palpitations    Ozempic 1 mg/week  Metformin 1000 mg BID  Farxiga 10 mg/day  Glipizide 5 mg BID     Patient is testing glucose 1 time daily  Records reviewed, on file     Last eye exam:  April 2023    Past Medical History  He has a past medical history of Achilles tendinitis, unspecified leg (05/26/2021), Elevation of levels of liver transaminase levels (02/27/2019), Elevation of levels of liver transaminase levels (06/06/2018), Personal history of other diseases of the circulatory system, Personal history of other diseases of the respiratory system (05/26/2021), Personal history of other endocrine, nutritional and metabolic disease, and Strain of unspecified achilles tendon, initial encounter (05/26/2021).    Surgical History  He has a past surgical history that includes Carpal tunnel release (03/16/2015) and US guided needle liver biopsy (12/7/2020).     Social History  He reports that he has never smoked. He has never used smokeless tobacco. No history on file for alcohol use and drug use.    Family History  Family History   Problem Relation Name Age of Onset    Diabetes Father      Other (cardiac disorder) Other      Hypertension Other         Medications  Current Outpatient Medications   Medication Instructions    allopurinol (ZYLOPRIM) 300 mg, oral, Daily    aspirin 81 mg, oral    atorvastatin (LIPITOR) 40 mg, oral, Nightly    blood sugar diagnostic (OneTouch Verio test strips) strip For glucose testing once daily    blood-glucose meter (OneTouch Verio Flex meter) misc For glucose testing once daily    cetirizine (ZYRTEC) 10 mg capsule 1 capsule, oral, As needed    Farxiga 10 mg, oral, Daily before breakfast    glipiZIDE (GLUCOTROL) 5 mg, oral, 2 times daily before meals    Kerendia 20 mg, oral, Daily    lancets  (OneTouch Delica Plus Lancet) 33 gauge misc For glucose testing once daily    lisinopril 10 mg, oral, Daily    metFORMIN (GLUCOPHAGE) 1,000 mg, oral, 2 times daily    montelukast (Singulair) 10 mg tablet 1 tablet, oral, Nightly    Ozempic 1 mg, subcutaneous, Every 7 days       Allergies  Doxycycline    Review of Systems   Constitutional:  Positive for fatigue. Negative for appetite change and fever.   HENT:  Negative for sore throat.         Denies dry mouth   Eyes:  Negative for visual disturbance.   Respiratory:  Negative for cough and shortness of breath.    Cardiovascular:  Negative for chest pain.   Gastrointestinal:  Negative for abdominal pain, constipation, diarrhea, nausea and vomiting.   Endocrine: Negative for polydipsia and polyuria.   Genitourinary:  Negative for frequency.   Musculoskeletal:  Negative for arthralgias.   Skin:  Negative for rash.   Neurological:  Negative for headaches.   Psychiatric/Behavioral:  The patient is not nervous/anxious.          Last Recorded Vitals  Blood pressure 101/67, pulse (!) 133, weight 92.1 kg (203 lb).    Physical Exam  Constitutional:       General: He is not in acute distress.  HENT:      Head: Normocephalic.      Mouth/Throat:      Mouth: Mucous membranes are moist.   Eyes:      Extraocular Movements: Extraocular movements intact.   Neck:      Thyroid: No thyroid mass or thyromegaly.   Cardiovascular:      Rate and Rhythm: Regular rhythm. Tachycardia present.      Pulses:           Radial pulses are 2+ on the right side and 2+ on the left side.      Heart sounds: No murmur heard.  Musculoskeletal:      Right lower leg: No edema.      Left lower leg: No edema.   Lymphadenopathy:      Cervical: No cervical adenopathy.   Neurological:      Mental Status: He is alert.      Motor: No tremor.   Psychiatric:         Mood and Affect: Affect normal.          Relevant Results  Glucose (mg/dL)   Date Value   02/10/2024 98   12/19/2023 104 (H)   08/09/2023 141 (H)    06/21/2023 180 (H)   04/27/2023 130 (H)     TR HGBA1C (Data Conversion) (%)   Date Value   07/08/2020 8.6 (H)   03/10/2020 9.4 (H)   12/10/2019 7.6 (H)     POC HEMOGLOBIN A1c (%)   Date Value   10/30/2023 6.9 (A)     Hemoglobin A1C (%)   Date Value   06/03/2024 6.8 (H)   12/19/2023 6.7 (H)   06/21/2023 8.1 (A)   11/10/2021 7.0 (A)   05/13/2021 7.2     Bicarbonate (mmol/L)   Date Value   02/10/2024 27   12/19/2023 31   08/09/2023 27   06/21/2023 25   04/27/2023 28     Urea Nitrogen (mg/dL)   Date Value   02/10/2024 14   12/19/2023 16   08/09/2023 16   06/21/2023 18   04/27/2023 12     Creatinine (mg/dL)   Date Value   02/10/2024 0.85   12/19/2023 1.02   08/09/2023 0.83   06/21/2023 0.96   04/27/2023 0.84     Lab Results   Component Value Date    CHOL 81 06/01/2024    CHOL 90 12/19/2023    CHOL 92 06/21/2023     Lab Results   Component Value Date    HDL 31.3 06/01/2024    HDL 32.6 12/19/2023    HDL 26.4 (A) 06/21/2023     Lab Results   Component Value Date    LDLCALC 11 06/01/2024    LDLCALC 7 12/19/2023     Lab Results   Component Value Date    TRIG 192 (H) 06/01/2024    TRIG 253 (H) 12/19/2023    TRIG 440 (H) 06/21/2023     Lab Results   Component Value Date    TSH 1.48 04/27/2023       IMPRESSION  TYPE 2 DIABETES MELLITUS   Excellent glucose control    TACHYCARDIA  Patient presented with asymptomatic regular tachycardia  Similar episode on presentation in February 2023      RECOMMENDATIONS  EKG Today    Follow up with Dr. Richardson  Follow pulse  If chest pain or shortness of breath, go to ED    Follow up 6 months

## 2024-08-15 DIAGNOSIS — R80.9 DIABETES MELLITUS WITH MICROALBUMINURIA (MULTI): ICD-10-CM

## 2024-08-15 DIAGNOSIS — E11.29 DIABETES MELLITUS WITH MICROALBUMINURIA (MULTI): ICD-10-CM

## 2024-08-15 RX ORDER — FINERENONE 20 MG/1
20 TABLET, FILM COATED ORAL DAILY
Qty: 30 TABLET | Refills: 0 | Status: SHIPPED | OUTPATIENT
Start: 2024-08-15

## 2024-08-26 DIAGNOSIS — E11.69 TYPE 2 DIABETES MELLITUS WITH OTHER SPECIFIED COMPLICATION, WITHOUT LONG-TERM CURRENT USE OF INSULIN (MULTI): ICD-10-CM

## 2024-08-27 RX ORDER — DAPAGLIFLOZIN 10 MG/1
10 TABLET, FILM COATED ORAL EVERY MORNING
Qty: 90 TABLET | Refills: 1 | Status: SHIPPED | OUTPATIENT
Start: 2024-08-27

## 2024-09-13 DIAGNOSIS — R80.9 DIABETES MELLITUS WITH MICROALBUMINURIA (MULTI): ICD-10-CM

## 2024-09-13 DIAGNOSIS — E78.49 OTHER HYPERLIPIDEMIA: ICD-10-CM

## 2024-09-13 DIAGNOSIS — E11.29 DIABETES MELLITUS WITH MICROALBUMINURIA (MULTI): ICD-10-CM

## 2024-09-15 RX ORDER — SEMAGLUTIDE 1.34 MG/ML
INJECTION, SOLUTION SUBCUTANEOUS
Qty: 9 ML | Refills: 3 | Status: SHIPPED | OUTPATIENT
Start: 2024-09-15

## 2024-09-16 DIAGNOSIS — E11.29 DIABETES MELLITUS WITH MICROALBUMINURIA (MULTI): ICD-10-CM

## 2024-09-16 DIAGNOSIS — R80.9 DIABETES MELLITUS WITH MICROALBUMINURIA (MULTI): ICD-10-CM

## 2024-09-16 RX ORDER — ATORVASTATIN CALCIUM 40 MG/1
40 TABLET, FILM COATED ORAL NIGHTLY
Qty: 90 TABLET | Refills: 1 | Status: SHIPPED | OUTPATIENT
Start: 2024-09-16

## 2024-09-17 RX ORDER — FINERENONE 20 MG/1
20 TABLET, FILM COATED ORAL DAILY
Qty: 30 TABLET | Refills: 0 | Status: SHIPPED | OUTPATIENT
Start: 2024-09-17

## 2024-10-16 DIAGNOSIS — E11.29 DIABETES MELLITUS WITH MICROALBUMINURIA (MULTI): ICD-10-CM

## 2024-10-16 DIAGNOSIS — R80.9 DIABETES MELLITUS WITH MICROALBUMINURIA (MULTI): ICD-10-CM

## 2024-10-17 RX ORDER — FINERENONE 20 MG/1
20 TABLET, FILM COATED ORAL DAILY
Qty: 30 TABLET | Refills: 0 | Status: SHIPPED | OUTPATIENT
Start: 2024-10-17

## 2024-11-18 DIAGNOSIS — R80.9 DIABETES MELLITUS WITH MICROALBUMINURIA (MULTI): ICD-10-CM

## 2024-11-18 DIAGNOSIS — E11.29 DIABETES MELLITUS WITH MICROALBUMINURIA (MULTI): ICD-10-CM

## 2024-11-18 RX ORDER — BLOOD-GLUCOSE METER
EACH MISCELLANEOUS
Qty: 100 STRIP | Refills: 3 | Status: SHIPPED | OUTPATIENT
Start: 2024-11-18

## 2024-11-18 RX ORDER — LANCETS 33 GAUGE
EACH MISCELLANEOUS
Qty: 100 EACH | Refills: 3 | Status: SHIPPED | OUTPATIENT
Start: 2024-11-18

## 2024-11-18 RX ORDER — METFORMIN HYDROCHLORIDE 1000 MG/1
1000 TABLET ORAL 2 TIMES DAILY
Qty: 180 TABLET | Refills: 1 | Status: SHIPPED | OUTPATIENT
Start: 2024-11-18

## 2024-11-24 DIAGNOSIS — E11.29 DIABETES MELLITUS WITH MICROALBUMINURIA (MULTI): ICD-10-CM

## 2024-11-24 DIAGNOSIS — R80.9 DIABETES MELLITUS WITH MICROALBUMINURIA (MULTI): ICD-10-CM

## 2024-11-25 RX ORDER — FINERENONE 20 MG/1
20 TABLET, FILM COATED ORAL DAILY
Qty: 30 TABLET | Refills: 0 | Status: SHIPPED | OUTPATIENT
Start: 2024-11-25

## 2024-12-02 ENCOUNTER — LAB (OUTPATIENT)
Dept: LAB | Facility: LAB | Age: 54
End: 2024-12-02
Payer: COMMERCIAL

## 2024-12-02 ENCOUNTER — APPOINTMENT (OUTPATIENT)
Dept: PRIMARY CARE | Facility: CLINIC | Age: 54
End: 2024-12-02
Payer: COMMERCIAL

## 2024-12-02 VITALS
HEIGHT: 71 IN | BODY MASS INDEX: 28.9 KG/M2 | HEART RATE: 84 BPM | DIASTOLIC BLOOD PRESSURE: 72 MMHG | SYSTOLIC BLOOD PRESSURE: 105 MMHG | WEIGHT: 206.4 LBS

## 2024-12-02 DIAGNOSIS — R94.31 ABNORMAL EKG: ICD-10-CM

## 2024-12-02 DIAGNOSIS — Z13.6 SCREENING, ISCHEMIC HEART DISEASE: ICD-10-CM

## 2024-12-02 DIAGNOSIS — E11.69 TYPE 2 DIABETES MELLITUS WITH OTHER SPECIFIED COMPLICATION, WITHOUT LONG-TERM CURRENT USE OF INSULIN: ICD-10-CM

## 2024-12-02 DIAGNOSIS — R80.9 DIABETES MELLITUS WITH MICROALBUMINURIA (MULTI): ICD-10-CM

## 2024-12-02 DIAGNOSIS — E11.69 DM TYPE 2 WITH DIABETIC DYSLIPIDEMIA (MULTI): ICD-10-CM

## 2024-12-02 DIAGNOSIS — E11.21 DIABETES MELLITUS WITH NEPHROPATHY (MULTI): ICD-10-CM

## 2024-12-02 DIAGNOSIS — E11.29 DIABETES MELLITUS WITH MICROALBUMINURIA (MULTI): ICD-10-CM

## 2024-12-02 DIAGNOSIS — E78.49 OTHER HYPERLIPIDEMIA: ICD-10-CM

## 2024-12-02 DIAGNOSIS — R00.2 PALPITATION: Primary | Chronic | ICD-10-CM

## 2024-12-02 DIAGNOSIS — M1A.9XX0 CHRONIC GOUT WITHOUT TOPHUS, UNSPECIFIED CAUSE, UNSPECIFIED SITE: ICD-10-CM

## 2024-12-02 DIAGNOSIS — E78.5 DM TYPE 2 WITH DIABETIC DYSLIPIDEMIA (MULTI): ICD-10-CM

## 2024-12-02 DIAGNOSIS — R00.2 PALPITATION: Chronic | ICD-10-CM

## 2024-12-02 LAB
ALBUMIN SERPL BCP-MCNC: 4.6 G/DL (ref 3.4–5)
ALP SERPL-CCNC: 85 U/L (ref 33–120)
ALT SERPL W P-5'-P-CCNC: 51 U/L (ref 10–52)
ANION GAP SERPL CALC-SCNC: 15 MMOL/L (ref 10–20)
AST SERPL W P-5'-P-CCNC: 35 U/L (ref 9–39)
BILIRUB SERPL-MCNC: 1.6 MG/DL (ref 0–1.2)
BUN SERPL-MCNC: 13 MG/DL (ref 6–23)
CALCIUM SERPL-MCNC: 10.2 MG/DL (ref 8.6–10.6)
CARDIAC TROPONIN I PNL SERPL HS: <3 NG/L (ref 0–53)
CHLORIDE SERPL-SCNC: 98 MMOL/L (ref 98–107)
CHOLEST SERPL-MCNC: 87 MG/DL (ref 0–199)
CHOLESTEROL/HDL RATIO: 2.6
CO2 SERPL-SCNC: 28 MMOL/L (ref 21–32)
CREAT SERPL-MCNC: 0.92 MG/DL (ref 0.5–1.3)
CREAT UR-MCNC: 194.8 MG/DL (ref 20–370)
EGFRCR SERPLBLD CKD-EPI 2021: >90 ML/MIN/1.73M*2
EST. AVERAGE GLUCOSE BLD GHB EST-MCNC: 143 MG/DL
GLUCOSE SERPL-MCNC: 115 MG/DL (ref 74–99)
HBA1C MFR BLD: 6.6 %
HDLC SERPL-MCNC: 34 MG/DL
LDLC SERPL CALC-MCNC: 10 MG/DL
MICROALBUMIN UR-MCNC: 264.4 MG/L
MICROALBUMIN/CREAT UR: 135.7 UG/MG CREAT
NON HDL CHOLESTEROL: 53 MG/DL (ref 0–149)
POTASSIUM SERPL-SCNC: 4.5 MMOL/L (ref 3.5–5.3)
PROT SERPL-MCNC: 7.7 G/DL (ref 6.4–8.2)
SODIUM SERPL-SCNC: 136 MMOL/L (ref 136–145)
TRIGL SERPL-MCNC: 213 MG/DL (ref 0–149)
VLDL: 43 MG/DL (ref 0–40)

## 2024-12-02 PROCEDURE — 82570 ASSAY OF URINE CREATININE: CPT

## 2024-12-02 PROCEDURE — 4010F ACE/ARB THERAPY RXD/TAKEN: CPT | Performed by: INTERNAL MEDICINE

## 2024-12-02 PROCEDURE — 3060F POS MICROALBUMINURIA REV: CPT | Performed by: INTERNAL MEDICINE

## 2024-12-02 PROCEDURE — 83036 HEMOGLOBIN GLYCOSYLATED A1C: CPT

## 2024-12-02 PROCEDURE — 3008F BODY MASS INDEX DOCD: CPT | Performed by: INTERNAL MEDICINE

## 2024-12-02 PROCEDURE — 3074F SYST BP LT 130 MM HG: CPT | Performed by: INTERNAL MEDICINE

## 2024-12-02 PROCEDURE — 82043 UR ALBUMIN QUANTITATIVE: CPT

## 2024-12-02 PROCEDURE — 3078F DIAST BP <80 MM HG: CPT | Performed by: INTERNAL MEDICINE

## 2024-12-02 PROCEDURE — 99214 OFFICE O/P EST MOD 30 MIN: CPT | Performed by: INTERNAL MEDICINE

## 2024-12-02 PROCEDURE — 36415 COLL VENOUS BLD VENIPUNCTURE: CPT

## 2024-12-02 PROCEDURE — 93000 ELECTROCARDIOGRAM COMPLETE: CPT | Performed by: INTERNAL MEDICINE

## 2024-12-02 PROCEDURE — 3044F HG A1C LEVEL LT 7.0%: CPT | Performed by: INTERNAL MEDICINE

## 2024-12-02 PROCEDURE — 1036F TOBACCO NON-USER: CPT | Performed by: INTERNAL MEDICINE

## 2024-12-02 PROCEDURE — 80053 COMPREHEN METABOLIC PANEL: CPT

## 2024-12-02 PROCEDURE — 84484 ASSAY OF TROPONIN QUANT: CPT

## 2024-12-02 PROCEDURE — 3048F LDL-C <100 MG/DL: CPT | Performed by: INTERNAL MEDICINE

## 2024-12-02 PROCEDURE — 80061 LIPID PANEL: CPT

## 2024-12-02 ASSESSMENT — ENCOUNTER SYMPTOMS
CONFUSION: 0
SPEECH DIFFICULTY: 0
SEIZURES: 0
POLYDIPSIA: 0
FATIGUE: 0
WEAKNESS: 0
POLYPHAGIA: 0
BLURRED VISION: 0
VISUAL CHANGE: 0
SWEATS: 0
BLACKOUTS: 0
HEADACHES: 0
WEIGHT LOSS: 0
HUNGER: 0
DIZZINESS: 0
TREMORS: 0
NERVOUS/ANXIOUS: 0

## 2024-12-02 NOTE — PROGRESS NOTES
"Patient ID: Luis Manuel Moore is a 54 y.o. male who presents for Follow-up.    /72   Pulse 84   Ht 1.803 m (5' 11\")   Wt 93.6 kg (206 lb 6.4 oz)   BMI 28.79 kg/m²     Diabetes  He has type 2 diabetes mellitus. No MedicAlert identification noted. The initial diagnosis of diabetes was made 8 years ago. Pertinent negatives for hypoglycemia include no hunger, mood changes, sleepiness or sweats. Pertinent negatives for diabetes include no blurred vision, no foot paresthesias, no foot ulcerations, no visual change and no weight loss. Pertinent negatives for hypoglycemia complications include no blackouts, no hospitalization, no nocturnal hypoglycemia, no required assistance and no required glucagon injection. Symptoms are stable. Pertinent negatives for diabetic complications include no CVA, heart disease, impotence, nephropathy, peripheral neuropathy, PVD or retinopathy. Risk factors for coronary artery disease include family history. Current diabetic treatment includes oral agent (triple therapy). He is compliant with treatment all of the time. His weight is stable. He is following a generally unhealthy diet. When asked about meal planning, he reported none. He has not had a previous visit with a dietitian. He participates in exercise intermittently. He monitors blood glucose at home 1-2 x per day. Blood glucose monitoring compliance is excellent. There is no change in his home blood glucose trend. His breakfast blood glucose is taken between 7-8 am. His breakfast blood glucose range is generally 110-130 mg/dl. His lunch blood glucose is taken between 12-1 pm. His dinner blood glucose is taken between 6-7 pm. His bedtime blood glucose is taken after 11 pm. His overall blood glucose range is 110-130 mg/dl. He does not see a podiatrist.Eye exam is current.       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   Eyes:  Negative for blurred vision.   Genitourinary:  Negative for impotence.       Objective     Physical Exam  Vitals and nursing note reviewed.   Neck:      Vascular: No carotid bruit.   Cardiovascular:      Rate and Rhythm: Regular rhythm.      Pulses: Normal pulses.      Heart sounds: Normal heart sounds.   Pulmonary:      Effort: Pulmonary effort is normal.      Breath sounds: Normal breath sounds.   Abdominal:      Palpations: There is no mass.   Musculoskeletal:      Right lower leg: No edema.      Left lower leg: No edema.   Skin:     Capillary Refill: Capillary refill takes more than 3 seconds.   Neurological:      General: No focal deficit present.      Mental Status: He is oriented to person, place, and time. Mental status is at baseline.   Psychiatric:         Mood and Affect: Mood normal.         Behavior: Behavior normal.         Thought Content: Thought content normal.         Judgment: Judgment normal.         No results found for: \"WBC\", \"HGB\", \"HCT\", \"MCV\", \"PLT\"        Problem List Items Addressed This Visit       Diabetes mellitus with microalbuminuria (Multi)    Hyperlipidemia    Chronic gout    Type 2 diabetes mellitus     Other Visit Diagnoses       Diabetes mellitus with nephropathy (Multi)    -  Primary    Relevant Orders    Hemoglobin A1c    Comprehensive metabolic panel    Albumin-Creatinine Ratio, Urine Random    DM type 2 with diabetic dyslipidemia (Multi)        Relevant Orders    Lipid panel              A/P       Will get A1c, lipid, CMP  Urine microalbumin, EKG  Holter " monitor 7-15 days  WITH THE TEST RESULT   CARDIAC CT W/O IV CONTRAST   Will follow through with your results

## 2024-12-24 ENCOUNTER — TELEPHONE (OUTPATIENT)
Dept: NEPHROLOGY | Facility: HOSPITAL | Age: 54
End: 2024-12-24
Payer: COMMERCIAL

## 2024-12-24 DIAGNOSIS — R80.9 DIABETES MELLITUS WITH MICROALBUMINURIA (MULTI): Primary | ICD-10-CM

## 2024-12-24 DIAGNOSIS — E11.29 DIABETES MELLITUS WITH MICROALBUMINURIA (MULTI): ICD-10-CM

## 2024-12-24 DIAGNOSIS — M1A.9XX0 CHRONIC GOUT WITHOUT TOPHUS, UNSPECIFIED CAUSE, UNSPECIFIED SITE: ICD-10-CM

## 2024-12-24 DIAGNOSIS — E11.29 DIABETES MELLITUS WITH MICROALBUMINURIA (MULTI): Primary | ICD-10-CM

## 2024-12-24 DIAGNOSIS — R80.9 DIABETES MELLITUS WITH MICROALBUMINURIA (MULTI): ICD-10-CM

## 2024-12-24 RX ORDER — FINERENONE 20 MG/1
20 TABLET, FILM COATED ORAL DAILY
Qty: 30 TABLET | Refills: 0 | OUTPATIENT
Start: 2024-12-24

## 2024-12-25 RX ORDER — ALLOPURINOL 300 MG/1
300 TABLET ORAL DAILY
Qty: 90 TABLET | Refills: 2 | Status: SHIPPED | OUTPATIENT
Start: 2024-12-25

## 2024-12-31 ENCOUNTER — TELEPHONE (OUTPATIENT)
Dept: PRIMARY CARE | Facility: CLINIC | Age: 54
End: 2024-12-31

## 2024-12-31 ENCOUNTER — LAB (OUTPATIENT)
Dept: LAB | Facility: LAB | Age: 54
End: 2024-12-31
Payer: COMMERCIAL

## 2024-12-31 DIAGNOSIS — R35.0 URINARY FREQUENCY: Primary | ICD-10-CM

## 2024-12-31 DIAGNOSIS — R35.0 URINARY FREQUENCY: ICD-10-CM

## 2024-12-31 LAB
APPEARANCE UR: CLEAR
BILIRUB UR STRIP.AUTO-MCNC: NEGATIVE MG/DL
COLOR UR: ABNORMAL
GLUCOSE UR STRIP.AUTO-MCNC: ABNORMAL MG/DL
KETONES UR STRIP.AUTO-MCNC: NEGATIVE MG/DL
LEUKOCYTE ESTERASE UR QL STRIP.AUTO: NEGATIVE
NITRITE UR QL STRIP.AUTO: NEGATIVE
PH UR STRIP.AUTO: 5.5 [PH]
PROT UR STRIP.AUTO-MCNC: NEGATIVE MG/DL
RBC # UR STRIP.AUTO: NEGATIVE /UL
SP GR UR STRIP.AUTO: 1.01
UROBILINOGEN UR STRIP.AUTO-MCNC: NORMAL MG/DL

## 2024-12-31 PROCEDURE — 81003 URINALYSIS AUTO W/O SCOPE: CPT

## 2024-12-31 PROCEDURE — 87086 URINE CULTURE/COLONY COUNT: CPT

## 2025-01-01 LAB — BACTERIA UR CULT: NORMAL

## 2025-01-29 ENCOUNTER — ANCILLARY PROCEDURE (OUTPATIENT)
Dept: CARDIOLOGY | Facility: CLINIC | Age: 55
End: 2025-01-29
Payer: COMMERCIAL

## 2025-01-29 DIAGNOSIS — R00.2 PALPITATION: Chronic | ICD-10-CM

## 2025-01-29 PROCEDURE — 93246 EXT ECG>7D<15D RECORDING: CPT

## 2025-02-11 LAB
25(OH)D3+25(OH)D2 SERPL-MCNC: 19 NG/ML (ref 30–100)
ALBUMIN SERPL-MCNC: 4.5 G/DL (ref 3.6–5.1)
ALBUMIN/CREAT UR: 80 MG/G CREAT
BUN SERPL-MCNC: 13 MG/DL (ref 7–25)
BUN/CREAT SERPL: ABNORMAL (CALC) (ref 6–22)
CALCIUM SERPL-MCNC: 9.9 MG/DL (ref 8.6–10.3)
CHLORIDE SERPL-SCNC: 102 MMOL/L (ref 98–110)
CO2 SERPL-SCNC: 25 MMOL/L (ref 20–32)
CREAT SERPL-MCNC: 0.98 MG/DL (ref 0.7–1.3)
CREAT UR-MCNC: 177 MG/DL (ref 20–320)
EGFRCR SERPLBLD CKD-EPI 2021: 91 ML/MIN/1.73M2
ERYTHROCYTE [DISTWIDTH] IN BLOOD BY AUTOMATED COUNT: 13.4 % (ref 11–15)
FERRITIN SERPL-MCNC: 41 NG/ML (ref 38–380)
GLUCOSE SERPL-MCNC: 144 MG/DL (ref 65–99)
HCT VFR BLD AUTO: 53.2 % (ref 38.5–50)
HGB BLD-MCNC: 17.2 G/DL (ref 13.2–17.1)
IRON SATN MFR SERPL: 27 % (CALC) (ref 20–48)
IRON SERPL-MCNC: 99 MCG/DL (ref 50–180)
MCH RBC QN AUTO: 31 PG (ref 27–33)
MCHC RBC AUTO-ENTMCNC: 32.3 G/DL (ref 32–36)
MCV RBC AUTO: 95.9 FL (ref 80–100)
MICROALBUMIN UR-MCNC: 14.2 MG/DL
PHOSPHATE SERPL-MCNC: 3.9 MG/DL (ref 2.5–4.5)
PLATELET # BLD AUTO: 285 THOUSAND/UL (ref 140–400)
PMV BLD REES-ECKER: 10 FL (ref 7.5–12.5)
POTASSIUM SERPL-SCNC: 4.8 MMOL/L (ref 3.5–5.3)
PTH-INTACT SERPL-MCNC: 12 PG/ML (ref 16–77)
RBC # BLD AUTO: 5.55 MILLION/UL (ref 4.2–5.8)
SODIUM SERPL-SCNC: 138 MMOL/L (ref 135–146)
TIBC SERPL-MCNC: 369 MCG/DL (CALC) (ref 250–425)
WBC # BLD AUTO: 8.9 THOUSAND/UL (ref 3.8–10.8)

## 2025-02-18 ENCOUNTER — HOSPITAL ENCOUNTER (OUTPATIENT)
Dept: RADIOLOGY | Facility: HOSPITAL | Age: 55
Discharge: HOME | End: 2025-02-18
Payer: COMMERCIAL

## 2025-02-18 DIAGNOSIS — Z13.6 SCREENING, ISCHEMIC HEART DISEASE: ICD-10-CM

## 2025-02-18 PROCEDURE — 75571 CT HRT W/O DYE W/CA TEST: CPT

## 2025-02-19 ENCOUNTER — APPOINTMENT (OUTPATIENT)
Dept: NEPHROLOGY | Facility: CLINIC | Age: 55
End: 2025-02-19
Payer: COMMERCIAL

## 2025-02-19 ENCOUNTER — OFFICE VISIT (OUTPATIENT)
Dept: ENDOCRINOLOGY | Facility: CLINIC | Age: 55
End: 2025-02-19
Payer: COMMERCIAL

## 2025-02-19 VITALS
HEIGHT: 72 IN | WEIGHT: 206 LBS | HEART RATE: 83 BPM | DIASTOLIC BLOOD PRESSURE: 75 MMHG | SYSTOLIC BLOOD PRESSURE: 115 MMHG | BODY MASS INDEX: 27.9 KG/M2

## 2025-02-19 VITALS
TEMPERATURE: 97.3 F | HEART RATE: 89 BPM | SYSTOLIC BLOOD PRESSURE: 95 MMHG | WEIGHT: 209 LBS | OXYGEN SATURATION: 95 % | BODY MASS INDEX: 29.26 KG/M2 | DIASTOLIC BLOOD PRESSURE: 66 MMHG | HEIGHT: 71 IN

## 2025-02-19 DIAGNOSIS — M1A.00X0 IDIOPATHIC CHRONIC GOUT WITHOUT TOPHUS, UNSPECIFIED SITE: ICD-10-CM

## 2025-02-19 DIAGNOSIS — E11.21 MACROALBUMINURIC DIABETIC NEPHROPATHY (MULTI): ICD-10-CM

## 2025-02-19 DIAGNOSIS — R80.9 DIABETES MELLITUS WITH MICROALBUMINURIA (MULTI): ICD-10-CM

## 2025-02-19 DIAGNOSIS — E11.00 TYPE 2 DIABETES MELLITUS WITH HYPEROSMOLARITY WITHOUT COMA, WITHOUT LONG-TERM CURRENT USE OF INSULIN (MULTI): ICD-10-CM

## 2025-02-19 DIAGNOSIS — R80.9 MICROALBUMINURIA: Primary | ICD-10-CM

## 2025-02-19 DIAGNOSIS — E11.29 DIABETES MELLITUS WITH MICROALBUMINURIA (MULTI): ICD-10-CM

## 2025-02-19 DIAGNOSIS — E11.69 TYPE 2 DIABETES MELLITUS WITH OTHER SPECIFIED COMPLICATION, WITHOUT LONG-TERM CURRENT USE OF INSULIN: ICD-10-CM

## 2025-02-19 DIAGNOSIS — E78.5 DYSLIPIDEMIA: ICD-10-CM

## 2025-02-19 DIAGNOSIS — E83.52 HYPERCALCEMIA: ICD-10-CM

## 2025-02-19 DIAGNOSIS — I10 PRIMARY HYPERTENSION: ICD-10-CM

## 2025-02-19 PROCEDURE — 99214 OFFICE O/P EST MOD 30 MIN: CPT | Performed by: INTERNAL MEDICINE

## 2025-02-19 PROCEDURE — 3078F DIAST BP <80 MM HG: CPT | Performed by: INTERNAL MEDICINE

## 2025-02-19 PROCEDURE — 1036F TOBACCO NON-USER: CPT | Performed by: INTERNAL MEDICINE

## 2025-02-19 PROCEDURE — 4010F ACE/ARB THERAPY RXD/TAKEN: CPT | Performed by: INTERNAL MEDICINE

## 2025-02-19 PROCEDURE — 3008F BODY MASS INDEX DOCD: CPT | Performed by: INTERNAL MEDICINE

## 2025-02-19 PROCEDURE — 3074F SYST BP LT 130 MM HG: CPT | Performed by: INTERNAL MEDICINE

## 2025-02-19 RX ORDER — GLIPIZIDE 5 MG/1
5 TABLET ORAL
Qty: 180 TABLET | Refills: 3 | Status: SHIPPED | OUTPATIENT
Start: 2025-02-19 | End: 2026-02-19

## 2025-02-19 RX ORDER — ACETAMINOPHEN 500 MG
5000 TABLET ORAL DAILY
Qty: 30 TABLET | Refills: 11 | Status: SHIPPED | OUTPATIENT
Start: 2025-02-19 | End: 2026-02-19

## 2025-02-19 ASSESSMENT — PAIN SCALES - GENERAL: PAINLEVEL_OUTOF10: 0-NO PAIN

## 2025-02-19 ASSESSMENT — ENCOUNTER SYMPTOMS
NAUSEA: 0
ABDOMINAL PAIN: 0
COUGH: 0
DIARRHEA: 1
APPETITE CHANGE: 0
ARTHRALGIAS: 0
HEADACHES: 0
VOMITING: 0
SORE THROAT: 0
CONSTIPATION: 0
NERVOUS/ANXIOUS: 0
UNEXPECTED WEIGHT CHANGE: 1
POLYDIPSIA: 0
FEVER: 0
SHORTNESS OF BREATH: 0
FREQUENCY: 0

## 2025-02-19 NOTE — PATIENT INSTRUCTIONS
Dear NICOLLE   It was nice seeing you in the nephrology clinic today     Today we discussed the following:     # You are leaking protein/Albumin most likely due to diabetes.  Protein leak improved and now down from 700 mg to 80 mg  -Continue currently Kerendia 20 mg, continuing Farxiga 10 mg and lisinopril 10 mg.  Your kidney function is excellent based on blood work done in February 2025  - Watch diet and increase exercise and physical activity   - Continue Metformin to 1000 mg x2 daily - before meals  -Continue lisinopril 10 mg. Check your blood pressure 2-3 times weekly.  - Continue Farxiga - makes you urinate sugar so watch for infection, don't hold urine in bladder   - Continue Atorvastatin for heart protection  -Continue Ozempic for diabetes control and heart/kidney health and weight loss    # Low vitamin D-start daily vitamin D supplement 5000 units    # History of gout-uric acid is now normal.  I suggest drop allopurinol from 300 down to 100 mg           Follow-up in 12 months with repeat blood work and urinalysis prior to next visit     Please call our office if you have any question  Thank you for coming to see me today     Tammy Hodgson MD, MS, RADHA TOM  Clinical  - [facility] [employer]  Nephrologist - [facility]

## 2025-02-19 NOTE — LETTER
February 19, 2025     Vangie Richardson MD  [address]  [address]  [facility] [address]    Patient: Luis Manuel Moore   YOB: 1970   Date of Visit: 2/19/2025       Dear Dr. Vangie Richardson MD:    Thank you for referring Luis Manuel Moore to me for evaluation. Below are my notes for this consultation.  If you have questions, please do not hesitate to call me. I look forward to following your patient along with you.       Sincerely,     Rupesh Dickinson MD      CC: No Recipients  ______________________________________________________________________________________    History Of Present Illness  Luis Manuel Moore is a 54 y.o. male     Duration of type 2 diabetes mellitus:  9 years  Complications:  albuminuria      Tachycardia.  Recent heart monitor and CT    Less appetite suppression  Dietary indiscretion, at night    Ozempic 1 mg/week  Metformin 1000 mg BID  Farxiga 10 mg/day  Glipizide 5 mg BID     Patient is testing glucose 1 time daily  Records reviewed, on file     Last eye exam:  April 2024      Past Medical History  He has a past medical history of Achilles tendinitis, unspecified leg (05/26/2021), Elevation of levels of liver transaminase levels (02/27/2019), Elevation of levels of liver transaminase levels (06/06/2018), Personal history of other diseases of the circulatory system, Personal history of other diseases of the respiratory system (05/26/2021), Personal history of other endocrine, nutritional and metabolic disease, and Strain of unspecified achilles tendon, initial encounter (05/26/2021).    Surgical History  He has a past surgical history that includes Carpal tunnel release (03/16/2015) and US guided needle liver biopsy (12/7/2020).     Social History  He reports that he has never smoked. He has never used smokeless tobacco. No history on file for alcohol use and drug use.    Family History  Family History   Problem Relation Name Age of Onset   • Diabetes Father     • Other (cardiac  "disorder) Other     • Hypertension Other         Medications  Current Outpatient Medications   Medication Instructions   • allopurinol (ZYLOPRIM) 300 mg, oral, Daily   • aspirin 81 mg   • atorvastatin (LIPITOR) 40 mg, oral, Nightly   • blood-glucose meter (OneTouch Verio Flex meter) misc For glucose testing once daily   • cetirizine (ZYRTEC) 10 mg capsule 1 capsule, As needed   • Farxiga 10 mg, oral, Every morning, (before breakfast)   • finerenone (KERENDIA) 20 mg, oral, Daily   • glipiZIDE (GLUCOTROL) 5 mg, oral, 2 times daily before meals   • lancets (OneTouch Delica Plus Lancet) 33 gauge misc FOR GLUCOSE TESTING ONCE A DAY   • lisinopril 10 mg, oral, Daily   • metFORMIN (GLUCOPHAGE) 1,000 mg, oral, 2 times daily   • montelukast (Singulair) 10 mg tablet 1 tablet, Nightly   • OneTouch Verio test strips strip FOR GLUCOSE TESTING ONCE A DAY   • semaglutide (Ozempic) 1 mg/dose (4 mg/3 mL) pen injector INJECT 1MG UNDER THE SKIN EVERY 7 DAYS       Allergies  Doxycycline    Review of Systems   Constitutional:  Positive for unexpected weight change. Negative for appetite change and fever.   HENT:  Negative for sore throat.         Denies dry mouth   Eyes:  Negative for visual disturbance.   Respiratory:  Negative for cough and shortness of breath.    Cardiovascular:  Negative for chest pain.   Gastrointestinal:  Positive for diarrhea. Negative for abdominal pain, constipation, nausea and vomiting.   Endocrine: Negative for polydipsia and polyuria.   Genitourinary:  Negative for frequency.   Musculoskeletal:  Negative for arthralgias.   Skin:  Negative for rash.   Neurological:  Negative for headaches.   Psychiatric/Behavioral:  The patient is not nervous/anxious.          Last Recorded Vitals  Blood pressure 115/75, pulse 83, height 1.816 m (5' 11.5\"), weight 93.4 kg (206 lb).    Physical Exam  Constitutional:       General: He is not in acute distress.  HENT:      Head: Normocephalic.      Mouth/Throat:      Mouth: " Mucous membranes are moist.   Eyes:      Extraocular Movements: Extraocular movements intact.   Neck:      Thyroid: No thyroid mass or thyromegaly.   Cardiovascular:      Pulses:           Radial pulses are 2+ on the right side and 2+ on the left side.   Musculoskeletal:      Right lower leg: No edema.      Left lower leg: No edema.   Lymphadenopathy:      Cervical: No cervical adenopathy.   Neurological:      Mental Status: He is alert.      Motor: No tremor.   Psychiatric:         Mood and Affect: Affect normal.          Relevant Results  GLUCOSE (mg/dL)   Date Value   02/10/2025 144 (H)     Glucose (mg/dL)   Date Value   12/02/2024 115 (H)   02/10/2024 98   12/19/2023 104 (H)     TR HGBA1C (Data Conversion) (%)   Date Value   07/08/2020 8.6 (H)   03/10/2020 9.4 (H)   12/10/2019 7.6 (H)     POC HEMOGLOBIN A1c (%)   Date Value   10/30/2023 6.9 (A)     Hemoglobin A1C (%)   Date Value   12/02/2024 6.6 (H)   06/03/2024 6.8 (H)   12/19/2023 6.7 (H)     CARBON DIOXIDE (mmol/L)   Date Value   02/10/2025 25     Bicarbonate (mmol/L)   Date Value   12/02/2024 28   02/10/2024 27   12/19/2023 31     UREA NITROGEN (BUN) (mg/dL)   Date Value   02/10/2025 13     Urea Nitrogen (mg/dL)   Date Value   12/02/2024 13   02/10/2024 14   12/19/2023 16     Creatinine (mg/dL)   Date Value   12/02/2024 0.92   02/10/2024 0.85   12/19/2023 1.02     CREATININE (mg/dL)   Date Value   02/10/2025 0.98     Lab Results   Component Value Date    CHOL 87 12/02/2024    CHOL 81 06/01/2024    CHOL 90 12/19/2023     Lab Results   Component Value Date    HDL 34.0 12/02/2024    HDL 31.3 06/01/2024    HDL 32.6 12/19/2023     Lab Results   Component Value Date    LDLCALC 10 12/02/2024    LDLCALC 11 06/01/2024    LDLCALC 7 12/19/2023     Lab Results   Component Value Date    TRIG 213 (H) 12/02/2024    TRIG 192 (H) 06/01/2024    TRIG 253 (H) 12/19/2023     Lab Results   Component Value Date    TSH 1.48 04/27/2023       IMPRESSION  TYPE 2 DIABETES  MELLITUS  Last A1c excellent  FBG rising  Appetite increasing      RECOMMENDATIONS  Continue current program  Discussed option to increase Ozempic    Follow up 6 months

## 2025-02-19 NOTE — PROGRESS NOTES
History Of Present Illness  Luis Manuel Moore is a 54 y.o. male     Duration of type 2 diabetes mellitus:  9 years  Complications:  albuminuria      Tachycardia.  Recent heart monitor and CT    Less appetite suppression  Dietary indiscretion, at night    Ozempic 1 mg/week  Metformin 1000 mg BID  Farxiga 10 mg/day  Glipizide 5 mg BID     Patient is testing glucose 1 time daily  Records reviewed, on file     Last eye exam:  April 2024      Past Medical History  He has a past medical history of Achilles tendinitis, unspecified leg (05/26/2021), Elevation of levels of liver transaminase levels (02/27/2019), Elevation of levels of liver transaminase levels (06/06/2018), Personal history of other diseases of the circulatory system, Personal history of other diseases of the respiratory system (05/26/2021), Personal history of other endocrine, nutritional and metabolic disease, and Strain of unspecified achilles tendon, initial encounter (05/26/2021).    Surgical History  He has a past surgical history that includes Carpal tunnel release (03/16/2015) and US guided needle liver biopsy (12/7/2020).     Social History  He reports that he has never smoked. He has never used smokeless tobacco. No history on file for alcohol use and drug use.    Family History  Family History   Problem Relation Name Age of Onset    Diabetes Father      Other (cardiac disorder) Other      Hypertension Other         Medications  Current Outpatient Medications   Medication Instructions    allopurinol (ZYLOPRIM) 300 mg, oral, Daily    aspirin 81 mg    atorvastatin (LIPITOR) 40 mg, oral, Nightly    blood-glucose meter (OneTouch Verio Flex meter) misc For glucose testing once daily    cetirizine (ZYRTEC) 10 mg capsule 1 capsule, As needed    Farxiga 10 mg, oral, Every morning, (before breakfast)    finerenone (KERENDIA) 20 mg, oral, Daily    glipiZIDE (GLUCOTROL) 5 mg, oral, 2 times daily before meals    lancets (OneTouch Delica Plus Lancet) 33 gauge misc  "FOR GLUCOSE TESTING ONCE A DAY    lisinopril 10 mg, oral, Daily    metFORMIN (GLUCOPHAGE) 1,000 mg, oral, 2 times daily    montelukast (Singulair) 10 mg tablet 1 tablet, Nightly    OneTouch Verio test strips strip FOR GLUCOSE TESTING ONCE A DAY    semaglutide (Ozempic) 1 mg/dose (4 mg/3 mL) pen injector INJECT 1MG UNDER THE SKIN EVERY 7 DAYS       Allergies  Doxycycline    Review of Systems   Constitutional:  Positive for unexpected weight change. Negative for appetite change and fever.   HENT:  Negative for sore throat.         Denies dry mouth   Eyes:  Negative for visual disturbance.   Respiratory:  Negative for cough and shortness of breath.    Cardiovascular:  Negative for chest pain.   Gastrointestinal:  Positive for diarrhea. Negative for abdominal pain, constipation, nausea and vomiting.   Endocrine: Negative for polydipsia and polyuria.   Genitourinary:  Negative for frequency.   Musculoskeletal:  Negative for arthralgias.   Skin:  Negative for rash.   Neurological:  Negative for headaches.   Psychiatric/Behavioral:  The patient is not nervous/anxious.          Last Recorded Vitals  Blood pressure 115/75, pulse 83, height 1.816 m (5' 11.5\"), weight 93.4 kg (206 lb).    Physical Exam  Constitutional:       General: He is not in acute distress.  HENT:      Head: Normocephalic.      Mouth/Throat:      Mouth: Mucous membranes are moist.   Eyes:      Extraocular Movements: Extraocular movements intact.   Neck:      Thyroid: No thyroid mass or thyromegaly.   Cardiovascular:      Pulses:           Radial pulses are 2+ on the right side and 2+ on the left side.   Musculoskeletal:      Right lower leg: No edema.      Left lower leg: No edema.   Lymphadenopathy:      Cervical: No cervical adenopathy.   Neurological:      Mental Status: He is alert.      Motor: No tremor.   Psychiatric:         Mood and Affect: Affect normal.          Relevant Results  GLUCOSE (mg/dL)   Date Value   02/10/2025 144 (H)     Glucose " (mg/dL)   Date Value   12/02/2024 115 (H)   02/10/2024 98   12/19/2023 104 (H)     TR HGBA1C (Data Conversion) (%)   Date Value   07/08/2020 8.6 (H)   03/10/2020 9.4 (H)   12/10/2019 7.6 (H)     POC HEMOGLOBIN A1c (%)   Date Value   10/30/2023 6.9 (A)     Hemoglobin A1C (%)   Date Value   12/02/2024 6.6 (H)   06/03/2024 6.8 (H)   12/19/2023 6.7 (H)     CARBON DIOXIDE (mmol/L)   Date Value   02/10/2025 25     Bicarbonate (mmol/L)   Date Value   12/02/2024 28   02/10/2024 27   12/19/2023 31     UREA NITROGEN (BUN) (mg/dL)   Date Value   02/10/2025 13     Urea Nitrogen (mg/dL)   Date Value   12/02/2024 13   02/10/2024 14   12/19/2023 16     Creatinine (mg/dL)   Date Value   12/02/2024 0.92   02/10/2024 0.85   12/19/2023 1.02     CREATININE (mg/dL)   Date Value   02/10/2025 0.98     Lab Results   Component Value Date    CHOL 87 12/02/2024    CHOL 81 06/01/2024    CHOL 90 12/19/2023     Lab Results   Component Value Date    HDL 34.0 12/02/2024    HDL 31.3 06/01/2024    HDL 32.6 12/19/2023     Lab Results   Component Value Date    LDLCALC 10 12/02/2024    LDLCALC 11 06/01/2024    LDLCALC 7 12/19/2023     Lab Results   Component Value Date    TRIG 213 (H) 12/02/2024    TRIG 192 (H) 06/01/2024    TRIG 253 (H) 12/19/2023     Lab Results   Component Value Date    TSH 1.48 04/27/2023       IMPRESSION  TYPE 2 DIABETES MELLITUS  Last A1c excellent  FBG rising  Appetite increasing      RECOMMENDATIONS  Continue current program  Discussed option to increase Ozempic    Follow up 6 months

## 2025-02-20 RX ORDER — LISINOPRIL 10 MG/1
10 TABLET ORAL DAILY
Qty: 90 TABLET | Refills: 1 | Status: SHIPPED | OUTPATIENT
Start: 2025-02-20

## 2025-02-20 RX ORDER — DAPAGLIFLOZIN 10 MG/1
TABLET, FILM COATED ORAL
Qty: 90 TABLET | Refills: 1 | Status: SHIPPED | OUTPATIENT
Start: 2025-02-20

## 2025-02-25 ENCOUNTER — OFFICE VISIT (OUTPATIENT)
Dept: URGENT CARE | Age: 55
End: 2025-02-25
Payer: COMMERCIAL

## 2025-02-25 DIAGNOSIS — S09.90XA TRAUMATIC INJURY OF HEAD, INITIAL ENCOUNTER: Primary | ICD-10-CM

## 2025-02-25 PROCEDURE — 4010F ACE/ARB THERAPY RXD/TAKEN: CPT | Performed by: NURSE PRACTITIONER

## 2025-02-25 PROCEDURE — 99499 UNLISTED E&M SERVICE: CPT | Performed by: NURSE PRACTITIONER

## 2025-02-25 NOTE — PROGRESS NOTES
Patient brought in by co-worker for slipping on the ice and hitting the back of his head. He states he is having a headache, vision changes, and nausea. He denies any LOC. He denies any use of blood thinners. Patient advised with symptoms and hitting his head he needs further evaluation in the ER.

## 2025-02-26 PROCEDURE — 93248 EXT ECG>7D<15D REV&INTERPJ: CPT | Performed by: INTERNAL MEDICINE

## 2025-05-13 ENCOUNTER — TELEPHONE (OUTPATIENT)
Dept: PRIMARY CARE | Facility: CLINIC | Age: 55
End: 2025-05-13
Payer: COMMERCIAL

## 2025-05-18 DIAGNOSIS — Z12.5 SCREENING FOR PROSTATE CANCER: ICD-10-CM

## 2025-05-18 DIAGNOSIS — E11.69 TYPE 2 DIABETES MELLITUS WITH OTHER SPECIFIED COMPLICATION, WITHOUT LONG-TERM CURRENT USE OF INSULIN: Primary | ICD-10-CM

## 2025-05-24 LAB
ALBUMIN SERPL-MCNC: 4.5 G/DL (ref 3.6–5.1)
ALBUMIN/CREAT UR: 163 MG/G CREAT
ALP SERPL-CCNC: 80 U/L (ref 35–144)
ALT SERPL-CCNC: 45 U/L (ref 9–46)
ANION GAP SERPL CALCULATED.4IONS-SCNC: 10 MMOL/L (CALC) (ref 7–17)
AST SERPL-CCNC: 33 U/L (ref 10–35)
BILIRUB SERPL-MCNC: 1.1 MG/DL (ref 0.2–1.2)
BUN SERPL-MCNC: 16 MG/DL (ref 7–25)
CALCIUM SERPL-MCNC: 9.9 MG/DL (ref 8.6–10.3)
CHLORIDE SERPL-SCNC: 100 MMOL/L (ref 98–110)
CHOLEST SERPL-MCNC: 80 MG/DL
CHOLEST/HDLC SERPL: 2.4 (CALC)
CO2 SERPL-SCNC: 28 MMOL/L (ref 20–32)
CREAT SERPL-MCNC: 0.87 MG/DL (ref 0.7–1.3)
CREAT UR-MCNC: 156 MG/DL (ref 20–320)
EGFRCR SERPLBLD CKD-EPI 2021: 101 ML/MIN/1.73M2
EST. AVERAGE GLUCOSE BLD GHB EST-MCNC: 151 MG/DL
EST. AVERAGE GLUCOSE BLD GHB EST-SCNC: 8.4 MMOL/L
GLUCOSE SERPL-MCNC: 121 MG/DL (ref 65–99)
HBA1C MFR BLD: 6.9 %
HDLC SERPL-MCNC: 34 MG/DL
LDLC SERPL CALC-MCNC: 23 MG/DL (CALC)
MICROALBUMIN UR-MCNC: 25.5 MG/DL
NONHDLC SERPL-MCNC: 46 MG/DL (CALC)
POTASSIUM SERPL-SCNC: 4.6 MMOL/L (ref 3.5–5.3)
PROT SERPL-MCNC: 7.4 G/DL (ref 6.1–8.1)
PSA SERPL-MCNC: 0.88 NG/ML
SODIUM SERPL-SCNC: 138 MMOL/L (ref 135–146)
TRIGL SERPL-MCNC: 148 MG/DL

## 2025-06-04 ENCOUNTER — APPOINTMENT (OUTPATIENT)
Dept: PRIMARY CARE | Facility: CLINIC | Age: 55
End: 2025-06-04
Payer: COMMERCIAL

## 2025-06-04 VITALS
SYSTOLIC BLOOD PRESSURE: 110 MMHG | HEART RATE: 82 BPM | WEIGHT: 204.4 LBS | OXYGEN SATURATION: 96 % | DIASTOLIC BLOOD PRESSURE: 73 MMHG | BODY MASS INDEX: 28.51 KG/M2

## 2025-06-04 DIAGNOSIS — E78.1 HYPERTRIGLYCERIDEMIA, ESSENTIAL: ICD-10-CM

## 2025-06-04 DIAGNOSIS — I10 PRIMARY HYPERTENSION: ICD-10-CM

## 2025-06-04 DIAGNOSIS — E11.21 MACROALBUMINURIC DIABETIC NEPHROPATHY (MULTI): ICD-10-CM

## 2025-06-04 DIAGNOSIS — K76.0 FATTY LIVER: ICD-10-CM

## 2025-06-04 DIAGNOSIS — M1A.00X0 IDIOPATHIC CHRONIC GOUT WITHOUT TOPHUS, UNSPECIFIED SITE: ICD-10-CM

## 2025-06-04 DIAGNOSIS — Z00.00 ANNUAL PHYSICAL EXAM: Primary | Chronic | ICD-10-CM

## 2025-06-04 PROCEDURE — 3074F SYST BP LT 130 MM HG: CPT | Performed by: INTERNAL MEDICINE

## 2025-06-04 PROCEDURE — 4010F ACE/ARB THERAPY RXD/TAKEN: CPT | Performed by: INTERNAL MEDICINE

## 2025-06-04 PROCEDURE — 3078F DIAST BP <80 MM HG: CPT | Performed by: INTERNAL MEDICINE

## 2025-06-04 PROCEDURE — 1036F TOBACCO NON-USER: CPT | Performed by: INTERNAL MEDICINE

## 2025-06-04 PROCEDURE — 99396 PREV VISIT EST AGE 40-64: CPT | Performed by: INTERNAL MEDICINE

## 2025-06-04 ASSESSMENT — PATIENT HEALTH QUESTIONNAIRE - PHQ9
1. LITTLE INTEREST OR PLEASURE IN DOING THINGS: NOT AT ALL
SUM OF ALL RESPONSES TO PHQ9 QUESTIONS 1 AND 2: 0
2. FEELING DOWN, DEPRESSED OR HOPELESS: NOT AT ALL

## 2025-06-04 ASSESSMENT — ENCOUNTER SYMPTOMS: CONSTITUTIONAL NEGATIVE: 1

## 2025-06-04 NOTE — PROGRESS NOTES
Patient ID: Luis Manuel Moore is a 55 y.o. male who presents for Annual Exam (CPE).    /73 (BP Location: Left arm, Patient Position: Sitting, BP Cuff Size: Adult)   Pulse 82   Wt 92.7 kg (204 lb 6.4 oz)   SpO2 96%   BMI 28.51 kg/m²     HPI        T2 DM CONTROLLED A1C 6.9   ON FINRENONE, , SGL INHIBITOR RAAS INHIBITOR   DOING BETTER ALSO ON OZEMPIC AND METFORMIN       DIABETES  WITH NEPHROPATHY IMPROVED       I AM HAVING WEIRED FEELING BOTTOM OF BOTH FEET   LEFT WORSE THAN THE RT ONE       CHRONIC GOUT STABLE         HTN ON MEDICATIONS CONTROLLED  NO CP, NO SOB , NO PND , NO ORTHOPNEA  NO PALPITATION , NO HEADACHE           Subjective     Review of Systems   Constitutional: Negative.    All other systems reviewed and are negative.      Objective     Physical Exam  Vitals and nursing note reviewed.   Constitutional:       Appearance: Normal appearance.   Neck:      Vascular: No carotid bruit.   Cardiovascular:      Rate and Rhythm: Normal rate and regular rhythm.      Pulses: Normal pulses.      Heart sounds: Normal heart sounds. No murmur heard.  Pulmonary:      Effort: Pulmonary effort is normal.      Breath sounds: Normal breath sounds.   Abdominal:      Palpations: There is no mass.   Musculoskeletal:      Right lower leg: No edema.      Left lower leg: No edema.   Skin:     Capillary Refill: Capillary refill takes more than 3 seconds.   Neurological:      General: No focal deficit present.      Mental Status: He is oriented to person, place, and time. Mental status is at baseline.   Psychiatric:         Mood and Affect: Mood normal.         Behavior: Behavior normal.         Thought Content: Thought content normal.         Judgment: Judgment normal.         Lab Results   Component Value Date    WBC 8.9 02/10/2025    HGB 17.2 (H) 02/10/2025    HCT 53.2 (H) 02/10/2025    MCV 95.9 02/10/2025     02/10/2025           Problem List Items Addressed This Visit       Annual physical exam - Primary    Primary  hypertension    Fatty liver    Hypertriglyceridemia, essential    Chronic gout    Relevant Orders    Uric acid    Macroalbuminuric diabetic nephropathy (Multi)            A/P       URIC ACID TODAY   FORM COMPLETED

## 2025-06-05 LAB — URATE SERPL-MCNC: 5 MG/DL (ref 4–8)

## 2025-06-09 ENCOUNTER — OFFICE VISIT (OUTPATIENT)
Dept: URGENT CARE | Age: 55
End: 2025-06-09
Payer: COMMERCIAL

## 2025-06-09 VITALS
HEART RATE: 111 BPM | BODY MASS INDEX: 28.56 KG/M2 | OXYGEN SATURATION: 99 % | DIASTOLIC BLOOD PRESSURE: 78 MMHG | RESPIRATION RATE: 18 BRPM | TEMPERATURE: 98.1 F | HEIGHT: 71 IN | WEIGHT: 204 LBS | SYSTOLIC BLOOD PRESSURE: 130 MMHG

## 2025-06-09 DIAGNOSIS — S30.813A ABRASION OF SCROTUM, INITIAL ENCOUNTER: Primary | ICD-10-CM

## 2025-06-09 RX ORDER — CEPHALEXIN 500 MG/1
500 CAPSULE ORAL 4 TIMES DAILY
Qty: 28 CAPSULE | Refills: 0 | Status: SHIPPED | OUTPATIENT
Start: 2025-06-09 | End: 2025-06-16

## 2025-06-09 NOTE — PATIENT INSTRUCTIONS
Please monitor symptoms for improvement at home.  You can follow-up with primary care to ensure resolution.  If symptoms get significantly worse with antibiotic therapy, you may need to be seen in the emergency department for further evaluation.

## 2025-06-09 NOTE — PROGRESS NOTES
"Subjective   Patient ID: Luis Manuel Moore is a 55 y.o. male who presents for Rash (RASH ON HIS SCROTUM  SORE AND HE FEELS HIS TESTICLES MIGHT BE SWOLLEN  2 DAYS  ).  History of Present Illness  Luis Manuel Moore is a 55 year old male who presents with scrotal discomfort. He has been experiencing discomfort and inflammation at the back of his scrotum for the past few days. Initially, he thought it might be due to rubbing the area wrong or an ingrown hair. He has been applying Neosporin, but there has been no improvement. The discomfort is particularly painful when walking or driving, as he feels his pulse in the area. He describes the sensation as similar to a 'rug burn' and notes that it becomes more irritated with movement.No fever or difficulty urinating.     ROS is negative unless otherwise stated in HPI.       Objective     /78   Pulse (!) 111   Temp 36.7 °C (98.1 °F) (Oral)   Resp 18   Ht 1.803 m (5' 11\")   Wt 92.5 kg (204 lb)   SpO2 99%   BMI 28.45 kg/m²        VS: As documented in the triage note and EMR flowsheet from this visit was reviewed  General: Well appearing. No acute distress.   Eyes:  Extraocular movements grossly intact. No scleral icterus.   Head: Atraumatic. Normocephalic.     Neck: No meningismus. No gross masses. Full movement through range of motion  CV: Regular rhythm. No murmurs, rubs, gallops appreciated.   Resp: Clear to auscultation bilaterally. No respiratory distress.    GI: Nontender. Soft. No masses. No rebound, rigidity or guarding.   : Chaperone present.  Abrasion over the scrotal area with some overlying scabbing.  Mild erythema.  No palpable masses.  No tenderness to palpation to the testicles.  No edema.  MSK: Symmetric muscle bulk. No gross step offs or deformities.  Skin: Warm, dry. No rashes  Neuro: CN II-VII intact. A&O x3. Speech fluent. Alert. Moving all extremities. Ambulates with normal gait  Psych: Appropriate mood and affect for situation      Point of Care Test " & Imaging Results from this visit  No results found for this visit on 06/09/25.   Imaging  No results found.    Cardiology, Vascular, and Other Imaging  No other imaging results found for the past 2 days      Diagnostic study results (if any) were reviewed by Treva Head PA-C.    Assessment/Plan   Allergies, medications, history, and pertinent labs/EKGs/Imaging reviewed by Treva Head PA-C.     Assessment & Plan  Patient is a 55-year-old male who presents for school abrasions/discomfort.  He has a scrotal abrasion on the posterior aspect of the scrotum, likely from mechanical irritation or minor trauma. He reports discomfort and redness over the past few days, with no improvement despite Neosporin use. There is no fever or urinary symptoms.   examination as noted above.  No edema, palpable masses or tenderness concerning for deep tissue pathology.  Monitor for signs of infection such as increased redness, swelling, or pus.  Will proceed with treatment with Keflex to cover for infection.  Advised on follow-up with primary care to ensure resolution.  If symptoms worsen despite antibiotic therapy advised more emergent reassessment. Patient informed of the diagnosis.  They are agreeable to the plan as discussed above.  Patient given the opportunity to ask questions.  All of the patient's questions were answered. Given precautions in which to seek attention in the emergency department. Discussed follow up with PCP or other appropriate clinician.      Orders and Diagnoses  Diagnoses and all orders for this visit:  Abrasion of scrotum, initial encounter  -     cephalexin (Keflex) 500 mg capsule; Take 1 capsule (500 mg) by mouth 4 times a day for 7 days.        Disposition:  Home      Treva Head PA-C     This medical note was created with the assistance of artificial intelligence (AI) for documentation purposes. The content has been reviewed and confirmed by the healthcare provider for accuracy and  completeness. Patient consented to the use of audio recording and use of AI during their visit.

## 2025-06-11 ENCOUNTER — APPOINTMENT (OUTPATIENT)
Dept: PRIMARY CARE | Facility: CLINIC | Age: 55
End: 2025-06-11
Payer: COMMERCIAL

## 2025-06-11 ENCOUNTER — OFFICE VISIT (OUTPATIENT)
Dept: PRIMARY CARE | Facility: CLINIC | Age: 55
End: 2025-06-11
Payer: COMMERCIAL

## 2025-06-11 VITALS
HEART RATE: 86 BPM | WEIGHT: 205.4 LBS | OXYGEN SATURATION: 98 % | SYSTOLIC BLOOD PRESSURE: 121 MMHG | BODY MASS INDEX: 28.65 KG/M2 | DIASTOLIC BLOOD PRESSURE: 76 MMHG

## 2025-06-11 DIAGNOSIS — D29.4 ANGIOKERATOMA OF SCROTUM: Primary | Chronic | ICD-10-CM

## 2025-06-11 DIAGNOSIS — E11.21 MACROALBUMINURIC DIABETIC NEPHROPATHY (MULTI): ICD-10-CM

## 2025-06-11 DIAGNOSIS — R80.9 DIABETES MELLITUS WITH MICROALBUMINURIA (MULTI): ICD-10-CM

## 2025-06-11 DIAGNOSIS — I10 PRIMARY HYPERTENSION: ICD-10-CM

## 2025-06-11 DIAGNOSIS — E11.29 DIABETES MELLITUS WITH MICROALBUMINURIA (MULTI): ICD-10-CM

## 2025-06-11 PROCEDURE — 3074F SYST BP LT 130 MM HG: CPT | Performed by: INTERNAL MEDICINE

## 2025-06-11 PROCEDURE — 1036F TOBACCO NON-USER: CPT | Performed by: INTERNAL MEDICINE

## 2025-06-11 PROCEDURE — 3078F DIAST BP <80 MM HG: CPT | Performed by: INTERNAL MEDICINE

## 2025-06-11 PROCEDURE — 99214 OFFICE O/P EST MOD 30 MIN: CPT | Performed by: INTERNAL MEDICINE

## 2025-06-11 PROCEDURE — 4010F ACE/ARB THERAPY RXD/TAKEN: CPT | Performed by: INTERNAL MEDICINE

## 2025-06-11 NOTE — PROGRESS NOTES
Patient ID: Luis Manuel Moore is a 55 y.o. male who presents for Testicle Pain.    /76 (BP Location: Left arm, Patient Position: Sitting, BP Cuff Size: Adult)   Pulse 86   Wt 93.2 kg (205 lb 6.4 oz)   SpO2 98%   BMI 28.65 kg/m²     HPI      Patient is here for evaluation of slight redness in the scrotal area  And noted discrete bluish pigmented area in the scrotum  No fever, no chills, not feeling sick  No urinary symptoms  No bleeding, no pain    Patient is on Farxiga 10 mg a day  For his diabetes and proteinuria      His diabetes is reasonably controlled  Last A1c 6.9      He has diabetes with nephropathy      Hypertension on medications controlled  No chest pain, no shortness of breath, no PND, no orthopnea,  No leg swelling, no palpitation, no headache,              Subjective     Review of Systems    Objective     Physical Exam  Vitals and nursing note reviewed.   Constitutional:       Appearance: Normal appearance.   Neck:      Vascular: No carotid bruit.   Cardiovascular:      Rate and Rhythm: Normal rate and regular rhythm.      Pulses: Normal pulses.      Heart sounds: Normal heart sounds. No murmur heard.  Pulmonary:      Effort: Pulmonary effort is normal.      Breath sounds: Normal breath sounds.   Abdominal:      Palpations: There is no mass.   Genitourinary:     Comments: Scrotum multiple bluish  Discrete pigmentation noted in the scrotum  Very typical and consistent with angiokeratoma of the scrotum  Musculoskeletal:      Right lower leg: No edema.      Left lower leg: No edema.   Skin:     Capillary Refill: Capillary refill takes more than 3 seconds.   Neurological:      General: No focal deficit present.      Mental Status: He is oriented to person, place, and time. Mental status is at baseline.   Psychiatric:         Mood and Affect: Mood normal.         Behavior: Behavior normal.         Thought Content: Thought content normal.         Judgment: Judgment normal.         Lab Results    Component Value Date    WBC 8.9 02/10/2025    HGB 17.2 (H) 02/10/2025    HCT 53.2 (H) 02/10/2025    MCV 95.9 02/10/2025     02/10/2025           Problem List Items Addressed This Visit       Primary hypertension    Diabetes mellitus with microalbuminuria (Multi)    Macroalbuminuric diabetic nephropathy (Multi)    Angiokeratoma of scrotum - Primary    Relevant Orders    Referral to Urology          A/P       Referral urology  Advised to hold Farxiga for 2 weeks  Advised to avoid shaving scrotum  Follow-up as needed

## 2025-07-05 DIAGNOSIS — E78.49 OTHER HYPERLIPIDEMIA: ICD-10-CM

## 2025-07-05 DIAGNOSIS — R80.9 DIABETES MELLITUS WITH MICROALBUMINURIA (MULTI): ICD-10-CM

## 2025-07-05 DIAGNOSIS — E11.29 DIABETES MELLITUS WITH MICROALBUMINURIA (MULTI): ICD-10-CM

## 2025-07-06 RX ORDER — METFORMIN HYDROCHLORIDE 1000 MG/1
1000 TABLET ORAL 2 TIMES DAILY
Qty: 180 TABLET | Refills: 1 | Status: SHIPPED | OUTPATIENT
Start: 2025-07-06

## 2025-07-06 RX ORDER — ATORVASTATIN CALCIUM 40 MG/1
40 TABLET, FILM COATED ORAL NIGHTLY
Qty: 90 TABLET | Refills: 1 | Status: SHIPPED | OUTPATIENT
Start: 2025-07-06

## 2025-07-14 ENCOUNTER — OFFICE VISIT (OUTPATIENT)
Dept: ORTHOPEDIC SURGERY | Facility: CLINIC | Age: 55
End: 2025-07-14
Payer: COMMERCIAL

## 2025-07-14 ENCOUNTER — HOSPITAL ENCOUNTER (OUTPATIENT)
Dept: RADIOLOGY | Facility: CLINIC | Age: 55
Discharge: HOME | End: 2025-07-14
Payer: COMMERCIAL

## 2025-07-14 DIAGNOSIS — M76.899 HAMSTRING TENDONITIS: ICD-10-CM

## 2025-07-14 DIAGNOSIS — M79.605 LEFT LEG PAIN: ICD-10-CM

## 2025-07-14 PROCEDURE — 99213 OFFICE O/P EST LOW 20 MIN: CPT | Performed by: STUDENT IN AN ORGANIZED HEALTH CARE EDUCATION/TRAINING PROGRAM

## 2025-07-14 PROCEDURE — 99202 OFFICE O/P NEW SF 15 MIN: CPT | Performed by: STUDENT IN AN ORGANIZED HEALTH CARE EDUCATION/TRAINING PROGRAM

## 2025-07-14 PROCEDURE — 73552 X-RAY EXAM OF FEMUR 2/>: CPT | Mod: LT

## 2025-07-14 PROCEDURE — 4010F ACE/ARB THERAPY RXD/TAKEN: CPT | Performed by: STUDENT IN AN ORGANIZED HEALTH CARE EDUCATION/TRAINING PROGRAM

## 2025-07-14 PROCEDURE — 73552 X-RAY EXAM OF FEMUR 2/>: CPT | Mod: LEFT SIDE | Performed by: RADIOLOGY

## 2025-07-16 NOTE — PROGRESS NOTES
Luis Manuel Moore  is a 55 y.o. year-old  male. he is a new patient to our office and presents with a chief complaint of left thigh and pain over his buttock.  There is been no injury.  Feels pain over the sit bone it does radiate into the thigh as well no numbness or tingling noted.     Past Medical, Family, and Social History reviewed and inlcude:[none] all other history pertinent to the presenting problem  Review of Systems  A complete review of systems was conducted, pertinent only to the HPI noted above.  Constitutional: None  Eyes: No additions to above history  Ears, Nose, Throat: No additions to above history  Cardiovascular: No additions to above history  Respiratory: No additions to above history  GI: No additions to above history  : No additions to above history  Skin/Neuro: No additions to above history  Endocrine/Heme/Lymph: No additions to above history  Immunologic: No additions to above history  Psychiatric: No additions to above history  Musculoskeletal: see above  Eyes: sclera anicteric  ENT: hearing appropriate for normal conversation, neck appears symmetric with no gross thyromegaly  Pulm: No labored breathing, no wheezing  CVS: Regular rate and rhythm  Abd: Non-distended  Skin: No rashes, erythema, or induration around hip    MUSCULOSKELETAL EXAM: HIP      There is some tightness of bilateral hamstrings and some tenderness over the ischial tuberosity on the left tendon is palpably intact and no defect in the hamstring musculature either he is got good power but some mild pain in the tuberosity      Neurovascularly Intact to Femroal/obturator/LFCN/S/S/SPN/DPN/T nerves on bilateral extremities    Xrays independently reviewed and interpreted: No evidence of degenerative changes or tuberosity fracture    Reviewed the diagnosis of proximal hamstring tendinitis and have recommended physical therapy.  If his pain does not improve then would consider an MRI to look for partial hamstring tear.  All  questions were answered he is in agreement with this plan

## 2025-08-04 ENCOUNTER — EVALUATION (OUTPATIENT)
Dept: PHYSICAL THERAPY | Facility: CLINIC | Age: 55
End: 2025-08-04
Payer: COMMERCIAL

## 2025-08-04 DIAGNOSIS — M54.32 SCIATICA OF LEFT SIDE: Primary | ICD-10-CM

## 2025-08-04 PROCEDURE — 97161 PT EVAL LOW COMPLEX 20 MIN: CPT | Mod: GP | Performed by: PHYSICAL THERAPIST

## 2025-08-04 PROCEDURE — 97110 THERAPEUTIC EXERCISES: CPT | Mod: GP | Performed by: PHYSICAL THERAPIST

## 2025-08-04 ASSESSMENT — ENCOUNTER SYMPTOMS
LOSS OF SENSATION IN FEET: 0
OCCASIONAL FEELINGS OF UNSTEADINESS: 0
DEPRESSION: 0

## 2025-08-04 ASSESSMENT — COLUMBIA-SUICIDE SEVERITY RATING SCALE - C-SSRS
6. HAVE YOU EVER DONE ANYTHING, STARTED TO DO ANYTHING, OR PREPARED TO DO ANYTHING TO END YOUR LIFE?: NO
2. HAVE YOU ACTUALLY HAD ANY THOUGHTS OF KILLING YOURSELF?: NO
1. IN THE PAST MONTH, HAVE YOU WISHED YOU WERE DEAD OR WISHED YOU COULD GO TO SLEEP AND NOT WAKE UP?: NO

## 2025-08-04 NOTE — PROGRESS NOTES
Physical Therapy Evaluation    Patient Name: Luis Manuel Moore  MRN: [id number]  Today's Date: 8/4/2025  Referred by: Dr. Alvarez  Visit: 1/25    Time Calculation  Start Time: 1630  Stop Time: 1720  Time Calculation (min): 50 min  Diagnosis:  1. Sciatica of left side  Follow Up In Physical Therapy        PRECAUTIONS:   Fall Risk: None    SUBJECTIVE:  Patient reports insidious onset of left posterior thigh pain that radiates down to the foot on occasion that started in May, similar pain in the past that resolved but this time symptoms stayed for several weeks, pain when first getting up from sitting, difficulty sitting on left cheek, symptoms have since resolved,  x-rays of hip negative, presents with diagnosis of proximal HS tendinitis.  Pain:  Currently 0/10  Home Living:  No issues  Prior level of function:  Several incidents of left posterior thigh pain in the past  Personal Factors Impacting Care:  DM    OBJECTIVE:  Hip AROM: (degrees) Left Right   Flexion  100   Abduction  45   External Rotation  30   Internal Rotation  20     Hip Strength: MMT Left Right   Flexion /5 5/5   Extension /5 4/5   Abduction /5 4-/5   External Rotation /5 4/5   *denotes pain with motion or muscle testing    Gait:  Normal  Palpation:  +TTP left piriformis, lateral hip, - ischial tuberosity  Flexibility:   Hamstrings: tight   Positive Special Tests:  +SNTT left side    Functional Outcome Measure:  LEFS: 63    ASSESSMENT:  Patient presents with c/o posterior left thigh pain with occasional pain into the foot that has since resolved, continues to have hip weakness and sciatic nerve tightness, likely patient was dealing with sciatica vs true HS tendinitis, patient to start consistent flexibility and hip strengthening program with a goal of preventing future issues.    Patient presents with the following deficits/problems that indicate the need for skilled PT: left hip weakness.    Low complexity due to patient's clinical presentation being stable  and uncomplicated by any significant comorbidities that may affect rehab tolerance and progression.     Clinical presentation:  Stable and/or uncomplicated characteristics    TREATMENT:  Initial evaluation performed followed by discussion of findings and instruction in HEP.    PATIENT EDUCATION:  Access Code: [email or web address]  URL: [email or web address]  Date: 08/04/2025  Prepared by: Soham Mireles    Exercises  - Supine Hamstring Stretch  - 1 x daily - 7 x weekly - 1 sets - 15 reps - 5 hold  - Supine Piriformis Stretch with Leg Straight  - 1 x daily - 7 x weekly - 1 sets - 5 reps - 30 hold  - Supine Bridge with Gluteal Set and Spinal Articulation  - 1 x daily - 7 x weekly - 3 sets - 10 reps  - Clamshell  - 1 x daily - 7 x weekly - 3 sets - 10 reps  - Sidelying Hip Abduction  - 1 x daily - 7 x weekly - 3 sets - 10 reps  - Seated Sciatic Tensioner  - 1 x daily - 7 x weekly - 2 sets - 10 reps    PLAN:   HEP daily, follow-up in several weeks.    Rehab potential:  Good  Plan of care agreement  Patient    GOALS:  Active       PT Problem       Improve left hip strength to at least 4+/5       Start:  08/04/25    Expected End:  09/29/25            Independent with HEP       Start:  08/04/25    Expected End:  09/29/25            No incidents of LLE symptoms       Start:  08/04/25    Expected End:  09/29/25            Patient Stated Goal: increase LLE strength and flexibility       Start:  08/04/25    Expected End:  09/29/25

## 2025-08-07 ENCOUNTER — APPOINTMENT (OUTPATIENT)
Dept: PHYSICAL THERAPY | Facility: CLINIC | Age: 55
End: 2025-08-07
Payer: COMMERCIAL

## 2025-08-18 ENCOUNTER — TREATMENT (OUTPATIENT)
Dept: PHYSICAL THERAPY | Facility: CLINIC | Age: 55
End: 2025-08-18
Payer: COMMERCIAL

## 2025-08-18 ENCOUNTER — APPOINTMENT (OUTPATIENT)
Dept: ENDOCRINOLOGY | Facility: CLINIC | Age: 55
End: 2025-08-18
Payer: COMMERCIAL

## 2025-08-18 VITALS
SYSTOLIC BLOOD PRESSURE: 110 MMHG | BODY MASS INDEX: 28.87 KG/M2 | DIASTOLIC BLOOD PRESSURE: 73 MMHG | WEIGHT: 207 LBS | HEART RATE: 73 BPM

## 2025-08-18 DIAGNOSIS — M54.32 SCIATICA OF LEFT SIDE: Primary | ICD-10-CM

## 2025-08-18 DIAGNOSIS — E11.29 DIABETES MELLITUS WITH MICROALBUMINURIA (MULTI): ICD-10-CM

## 2025-08-18 DIAGNOSIS — R80.9 DIABETES MELLITUS WITH MICROALBUMINURIA (MULTI): ICD-10-CM

## 2025-08-18 PROCEDURE — 4010F ACE/ARB THERAPY RXD/TAKEN: CPT | Performed by: INTERNAL MEDICINE

## 2025-08-18 PROCEDURE — 3078F DIAST BP <80 MM HG: CPT | Performed by: INTERNAL MEDICINE

## 2025-08-18 PROCEDURE — 3074F SYST BP LT 130 MM HG: CPT | Performed by: INTERNAL MEDICINE

## 2025-08-18 PROCEDURE — 99214 OFFICE O/P EST MOD 30 MIN: CPT | Performed by: INTERNAL MEDICINE

## 2025-08-18 PROCEDURE — 97110 THERAPEUTIC EXERCISES: CPT | Mod: GP | Performed by: PHYSICAL THERAPIST

## 2025-08-18 PROCEDURE — 1036F TOBACCO NON-USER: CPT | Performed by: INTERNAL MEDICINE

## 2025-08-18 RX ORDER — SEMAGLUTIDE 1.34 MG/ML
1 INJECTION, SOLUTION SUBCUTANEOUS
Qty: 9 ML | Refills: 3 | Status: SHIPPED | OUTPATIENT
Start: 2025-08-18 | End: 2026-08-18

## 2025-08-18 ASSESSMENT — ENCOUNTER SYMPTOMS
APPETITE CHANGE: 0
COUGH: 0
CONSTIPATION: 0
FEVER: 0
ABDOMINAL PAIN: 0
ARTHRALGIAS: 0
NAUSEA: 0
POLYDIPSIA: 0
SHORTNESS OF BREATH: 0
SORE THROAT: 0
NERVOUS/ANXIOUS: 0
FREQUENCY: 0
DIARRHEA: 0
VOMITING: 0
HEADACHES: 0

## 2025-08-20 ENCOUNTER — APPOINTMENT (OUTPATIENT)
Dept: PHYSICAL THERAPY | Facility: CLINIC | Age: 55
End: 2025-08-20
Payer: COMMERCIAL

## 2025-08-20 DIAGNOSIS — M54.32 SCIATICA OF LEFT SIDE: Primary | ICD-10-CM

## 2025-09-02 DIAGNOSIS — E11.69 TYPE 2 DIABETES MELLITUS WITH OTHER SPECIFIED COMPLICATION, WITHOUT LONG-TERM CURRENT USE OF INSULIN: ICD-10-CM

## 2025-09-02 RX ORDER — LISINOPRIL 10 MG/1
10 TABLET ORAL DAILY
Qty: 90 TABLET | Refills: 1 | Status: SHIPPED | OUTPATIENT
Start: 2025-09-02

## 2025-09-03 ENCOUNTER — APPOINTMENT (OUTPATIENT)
Dept: PHYSICAL THERAPY | Facility: CLINIC | Age: 55
End: 2025-09-03
Payer: COMMERCIAL

## 2025-09-03 DIAGNOSIS — M54.32 SCIATICA OF LEFT SIDE: Primary | ICD-10-CM

## 2025-09-11 ENCOUNTER — APPOINTMENT (OUTPATIENT)
Dept: UROLOGY | Facility: CLINIC | Age: 55
End: 2025-09-11
Payer: COMMERCIAL

## 2025-12-03 ENCOUNTER — APPOINTMENT (OUTPATIENT)
Dept: PRIMARY CARE | Facility: CLINIC | Age: 55
End: 2025-12-03
Payer: COMMERCIAL

## 2026-02-25 ENCOUNTER — APPOINTMENT (OUTPATIENT)
Dept: NEPHROLOGY | Facility: CLINIC | Age: 56
End: 2026-02-25
Payer: COMMERCIAL